# Patient Record
Sex: MALE | Race: WHITE | Employment: OTHER | URBAN - METROPOLITAN AREA
[De-identification: names, ages, dates, MRNs, and addresses within clinical notes are randomized per-mention and may not be internally consistent; named-entity substitution may affect disease eponyms.]

---

## 2017-02-15 ENCOUNTER — NURSE NAVIGATOR (OUTPATIENT)
Dept: FAMILY MEDICINE CLINIC | Age: 62
End: 2017-02-15

## 2017-02-15 DIAGNOSIS — E78.5 HYPERLIPIDEMIA, UNSPECIFIED HYPERLIPIDEMIA TYPE: ICD-10-CM

## 2017-02-15 DIAGNOSIS — I25.10 CORONARY ARTERY DISEASE INVOLVING NATIVE CORONARY ARTERY OF NATIVE HEART WITHOUT ANGINA PECTORIS: ICD-10-CM

## 2017-02-22 RX ORDER — ATORVASTATIN CALCIUM 80 MG/1
80 TABLET, FILM COATED ORAL DAILY
Qty: 90 TAB | Refills: 3 | Status: SHIPPED | OUTPATIENT
Start: 2017-02-22 | End: 2018-12-05 | Stop reason: SDUPTHER

## 2017-04-26 ENCOUNTER — PATIENT OUTREACH (OUTPATIENT)
Dept: FAMILY MEDICINE CLINIC | Age: 62
End: 2017-04-26

## 2017-04-26 NOTE — PROGRESS NOTES
4/26/17, Chart review, no ED/Hospitalizations noted at this time. No further F/U appts at [facility]Dr Villarreal. 4/26/17, This writer/NN contacted patient at listed phone number, HIPAA verified with 2 identifiers. Patient allowed this writer/NN to explain purpose of outreach, possible need for further community resources/PCP f/u appt. Patient verbalizes understanding, however states continues to face many struggles, unsteady housing, limited work/ability to work, and soon to be out of medications. Provided instruction on possible resources with [facility], [facility], [phone], [address] [place] [address], [phone], and [facility], [phone]. Patient agrees to consider scheduling appt with [facility]/Dr Monalisa Dong and explore provided resources. Patient denies further questions or concerns at this time and agrees to keep [facility] contact information to call with further questions or concerns. See Previous NN/Patient Outreach Documentation:  Patient last seen by [facility]/Dr Monalisa Dong, 1/12/16, kept Cardiology F/U appt, 9/28/16. Patient asking for further community resource information and possible scheduling diabetes F/U appt. Provided instruction on local /Medicaid Process, [facility] Card/APA, [address] [facility] and local clinic/[facility] Maribel is located within the [facility] Department at [address] [facility] [address] in [facility], [phone], Appts only, Mondays: 3:00 pm to 8:00 pm, Thursdays: 9:00 am to 3:00 pm.  Business Office, [phone] to begin application and [facility]([phone]) remains available to assist patients at this time.

## 2017-08-16 ENCOUNTER — PATIENT OUTREACH (OUTPATIENT)
Dept: FAMILY MEDICINE CLINIC | Age: 62
End: 2017-08-16

## 2017-08-16 NOTE — PROGRESS NOTES
Patient last seen by [facility]/Dr Paul Sanches, 1/12/16, kept Cardiology F/U appt, 9/28/16. Patient asking for further community resource information  8/16/17, Per chart review, no further ED/Hospitalizations, however, no F/U appt scheduled at [facility]. Patient has scheduled F/U appt with Cardiology, 9/20/17.  8/16/17, This writer/NN attempted to contact patient at listed phone number, left voicemail message with [facility] contact information and request for return call. PLAN: Close previous Adherence Plan and remain available for further NN/CCM needs. Should patient return call or be seen for F/U appt, confirm PCP, discuss recent health changes or concerns. Provide instruction, goal work and possible resources with [employer], [employer] [address], [id number],  [address], [phone], and [facility], [phone]. See Previous NN/Patient Outreach Documentation:  4/26/17, Chart review, no ED/Hospitalizations noted at this time. No further F/U appts at [facility]Dr Villarreal. 4/26/17, This writer/NN contacted patient at listed phone number, HIPAA verified with 2 identifiers. Patient allowed this writer/NN to explain purpose of outreach, possible need for further community resources/PCP f/u appt. Patient verbalizes understanding, however states continues to face many struggles, unsteady housing, limited work/ability to work, and soon to be out of medications. Provided instruction on possible resources with [employer], [employer] [address], [phone],  [address], [phone], and [facility], [phone]. Patient agrees to consider scheduling appt with [facility]Dr Paul Sanches and explore provided resources. Patient denies further questions or concerns at this time and agrees to keep [facility]/NN contact information to call with further questions or concerns. See Previous NN/Patient Outreach Documentation:  Patient last seen by [facility]/Dr Paul Sanches, 1/12/16, kept Cardiology F/U appt, 9/28/16.  Patient asking for further community resource information and possible scheduling diabetes F/U appt. Provided instruction on local /Medicaid Process, [facility]/APA, [facility] and [facility] clinic/[facility] Maribel is located within the [facility] Department at [facility] [address] in [facility], [phone], Appts only, Mondays: 3:00 pm to 8:00 pm, Thursdays: 9:00 am to 3:00 pm.  Business Office, [phone] to begin application and [facility]([phone]) remains available to assist patients at this time.

## 2017-09-20 ENCOUNTER — OFFICE VISIT (OUTPATIENT)
Dept: CARDIOLOGY CLINIC | Age: 62
End: 2017-09-20

## 2017-09-20 VITALS
WEIGHT: 226 LBS | OXYGEN SATURATION: 97 % | DIASTOLIC BLOOD PRESSURE: 80 MMHG | HEART RATE: 65 BPM | BODY MASS INDEX: 29.82 KG/M2 | SYSTOLIC BLOOD PRESSURE: 122 MMHG

## 2017-09-20 DIAGNOSIS — I25.10 CORONARY ARTERY DISEASE INVOLVING NATIVE CORONARY ARTERY OF NATIVE HEART WITHOUT ANGINA PECTORIS: ICD-10-CM

## 2017-09-20 DIAGNOSIS — E11.9 TYPE 2 DIABETES MELLITUS WITHOUT COMPLICATION, WITHOUT LONG-TERM CURRENT USE OF INSULIN (HCC): Chronic | ICD-10-CM

## 2017-09-20 DIAGNOSIS — I10 ESSENTIAL HYPERTENSION: Primary | ICD-10-CM

## 2017-09-20 NOTE — MR AVS SNAPSHOT
Visit Information Date & Time Provider Department Dept. Phone Encounter #  
 9/20/2017 10:20 AM Shaq Stein MD [facility] Keke Valentin [phone] [id number] Upcoming Health Maintenance Date Due  
 EYE EXAM RETINAL OR DILATED Q1 5/27/1966 Pneumococcal 19-64 Medium Risk (1 of 1 - PPSV23) 5/27/1975 FOBT Q 1 YEAR AGE 50-75 5/27/2006 ZOSTER VACCINE AGE 60> 3/27/2016 HEMOGLOBIN A1C Q6M 5/25/2016 FOOT EXAM Q1 11/25/2016 MICROALBUMIN Q1 11/25/2016 LIPID PANEL Q1 11/25/2016 INFLUENZA AGE 9 TO ADULT 8/1/2017 DTaP/Tdap/Td series (2 - Td) 5/17/2021 Allergies as of 9/20/2017  Review Complete On: 9/20/2017 By: Shaq Stein MD  
 No Known Allergies Current Immunizations  Reviewed on 11/19/2013 Name Date TDAP Vaccine 5/17/2011 Not reviewed this visit You Were Diagnosed With   
  
 Codes Comments Essential hypertension    -  Primary ICD-10-CM: I10 
ICD-9-CM: 401.9 Coronary artery disease involving native coronary artery of native heart without angina pectoris     ICD-10-CM: I25.10 ICD-9-CM: 414.01 Type 2 diabetes mellitus without complication, without long-term current use of insulin (HCC)     ICD-10-CM: E11.9 ICD-9-CM: 250.00 Vitals BP Pulse Weight(growth percentile) SpO2 BMI Smoking Status 122/80 (BP 1 Location: Right arm, BP Patient Position: Sitting) 65 226 lb (102.5 kg) 97% 29.82 kg/m2 Former Smoker Vitals History BMI and BSA Data Body Mass Index Body Surface Area  
 29.82 kg/m 2 2.3 m 2 Preferred Pharmacy Pharmacy Name Phone [facility], [address] [phone] Your Updated Medication List  
  
   
This list is accurate as of: 9/20/17 10:50 AM.  Always use your most recent med list.  
  
  
  
  
 aspirin delayed-release 81 mg tablet Take 81 mg by mouth daily. atorvastatin 80 mg tablet Commonly known as:  LIPITOR Take 1 Tab by mouth daily. carvedilol 12.5 mg tablet Commonly known as:  [place]  Take 1 Tab by mouth two (2) times daily (with meals). FISH OIL 1,000 mg Cap Generic drug:  omega-3 fatty acids-vitamin e Take 1 Cap by mouth See Admin Instructions. 1-2 capsules per day  
  
 lisinopril 10 mg tablet Commonly known as:  Albina Fess Take 1 Tab by mouth daily. metFORMIN 500 mg tablet Commonly known as:  GLUCOPHAGE Take 2 Tabs by mouth daily (with breakfast). We Performed the Following AMB POC EKG ROUTINE W/ 12 LEADS, INTER & REP [87253 CPT(R)] CBC W/O DIFF [56286 CPT(R)] HEMOGLOBIN A1C WITH EAG [58237 CPT(R)] LIPID PANEL [51245 CPT(R)] METABOLIC PANEL, COMPREHENSIVE [22579 CPT(R)] Introducing [facility]! Anthony Sandoval introduces [employer] patient portal. Now you can access parts of your medical record, email your doctor's office, and request medication refills online. 1. In your internet browser, go to [email or web address] 2. Click on the First Time User? Click Here link in the Sign In box. You will see the New Member Sign Up page. 3. Enter your [employer] Access Code exactly as it appears below. You will not need to use this code after youve completed the sign-up process. If you do not sign up before the expiration date, you must request a new code. · [employer] Access Code: [email or web address] Expires: 12/19/2017 10:50 AM 
 
4. Enter the last four digits of your Social Security Number (xxxx) and Date of Birth (mm/dd/yyyy) as indicated and click Submit. You will be taken to the next sign-up page. 5. Create a [employer] ID. This will be your Jobydu login ID and cannot be changed, so think of one that is secure and easy to remember. 6. Create a [employer] password. You can change your password at any time. 7. Enter your Password Reset Question and Answer. This can be used at a later time if you forget your password. 8. Enter your e-mail address. You will receive e-mail notification when new information is available in [address] 9. Click Sign Up. You can now view and download portions of your medical record. 10. Click the Download Summary menu link to download a portable copy of your medical information. If you have questions, please visit the Frequently Asked Questions section of the [employer] website. Remember, [employer] is NOT to be used for urgent needs. For medical emergencies, dial 911. Now available from your iPhone and Android! Please provide this summary of care documentation to your next provider. Your primary care clinician is listed as Phys Other. If you have any questions after today's visit, please call [phone].

## 2017-09-20 NOTE — PROGRESS NOTES
Frida Yun MD. [facility]              Patient: Candie Duffy  : 1955      Today's Date: 2017            HISTORY OF PRESENT ILLNESS:     History of Present Illness:  Mr. Xin Burks is here for follow-up. He is doing Ok overall. No CP or SOB. Has some knee pain. No syncope. PAST MEDICAL HISTORY:     Past Medical History:   Diagnosis Date    CAD (coronary artery disease)     STEMI 12    DDD (degenerative disc disease)     DM type 2 (diabetes mellitus, type 2) (HCC)     HLD (hyperlipidemia)     HTN (hypertension)     MI (myocardial infarction) ([facility] 2012    acute anterior STEMI felt to have occurred 36 hours PTA 12    Pseudoaneurysm ([facility]     thrombin injection          Past Surgical History:   Procedure Laterality Date    HX BACK SURGERY      HX HEART CATHETERIZATION  12    CATH: 12: mLAD: 85%  no other sig disease EF: 60% a BMS was placed in the mLAD    HX TUMOR REMOVAL      menigioma           MEDICATIONS:     Current Outpatient Prescriptions   Medication Sig Dispense Refill    atorvastatin (LIPITOR) 80 mg tablet Take 1 Tab by mouth daily. 90 Tab 3    lisinopril (PRINIVIL, ZESTRIL) 10 mg tablet Take 1 Tab by mouth daily. 90 Tab 3    carvedilol (COREG) 12.5 mg tablet Take 1 Tab by mouth two (2) times daily (with meals). 180 Tab 3    metFORMIN (GLUCOPHAGE) 500 mg tablet Take 2 Tabs by mouth daily (with breakfast). 180 Tab 3    aspirin delayed-release 81 mg tablet Take 81 mg by mouth daily.  omega-3 fatty acids-vitamin e (FISH OIL) 1,000 mg Cap Take 1 Cap by mouth See Admin Instructions.  1-2 capsules per day         No Known Allergies          SOCIAL HISTORY:     Social History   Substance Use Topics    Smoking status: Former Smoker     Quit date: 2011    Smokeless tobacco: Never Used    Alcohol use No         FAMILY HISTORY:     Family History   Problem Relation Age of Onset    Cancer Father 61     THROAT CANCER    Stroke Mother    REVIEW OF SYSTEMS:        Review of Systems:    Constitutional: Negative for fever, chills    HEENT: Negative for vision changes.    Respiratory: Negative for cough    Cardiovascular: Negative for orthopnea, syncope, and PND.    Gastrointestinal: Negative for abdominal pain, diarrhea, or melena    Genitourinary: Negative for dysuria    Musculoskeletal: Negative for myalgias. + joint pain    Skin: Negative for rash    Heme: No problems bleeding.    Neurological: Negative for speech change and focal weakness.                 PHYSICAL EXAM:      Physical Exam:  Visit Vitals    /80 (BP 1 Location: Right arm, BP Patient Position: Sitting)    Pulse 65    Wt 226 lb (102.5 kg)    SpO2 97%    BMI 29.82 kg/m2       Patient appears generally well, mood and affect are appropriate and pleasant. HEENT:  Hearing intact, non-icteric, normocephalic, atraumatic. Neck Exam: Supple, No JVD or carotid bruits. Lung Exam: Clear to auscultation, even breath sounds. Cardiac Exam: Regular rate and rhythm with no murmur  Abdomen: Soft, non-tender, normal bowel sounds. No bruits or masses. Extremities: Moves all ext well. No lower extremity edema. Vascular: 2+ dorsalis pedis pulses bilaterally.   Psych: Appropriate affect  Neuro - Grossly intact            CARDIAC DIAGNOSTICS:        EKG 6/11/14 - NSR, normal    EKG 9/23/15 - NSR, normal   EKG 9/28/16 - NSR, normal             Lab Results   Component Value Date/Time     SODIUM 137 11/25/2015 08:41 AM     POTASSIUM 4.3 11/25/2015 08:41 AM     CHLORIDE 98 11/25/2015 08:41 AM     CO2 22 11/25/2015 08:41 AM     ANION GAP 6 07/31/2012 04:28 AM     GLUCOSE 176 11/25/2015 08:41 AM     GLUCOSE 277 02/12/2010 09:03 AM     BUN 19 11/25/2015 08:41 AM     CREATININE 0.80 11/25/2015 08:41 AM     BUN/CREATININE RATIO 24 11/25/2015 08:41 AM     GFR EST  11/25/2015 08:41 AM     GFR EST NON-AA 98 11/25/2015 08:41 AM     CALCIUM 9.2 11/25/2015 08:41 AM     BILIRUBIN, TOTAL 1.1 11/25/2015 08:41 AM     ALT 22 11/25/2015 08:41 AM     AST 17 11/25/2015 08:41 AM     ALK. PHOSPHATASE 95 11/25/2015 08:41 AM     PROTEIN, TOTAL 6.5 11/25/2015 08:41 AM     ALBUMIN 4.7 11/25/2015 08:41 AM     GLOBULIN 3.3 07/28/2012 06:51 AM     A-G RATIO 2.6 11/25/2015 08:41 AM            Lab Results   Component Value Date/Time     HEMOGLOBIN A1C 7.4 11/25/2015 08:41 AM               Lab Results   Component Value Date/Time     WBC 5.7 11/25/2015 08:41 AM     HEMOGLOBIN (POC) 16.7 07/28/2012 06:53 AM     HGB 15.6 11/25/2015 08:41 AM     HEMATOCRIT (POC) 49 07/28/2012 06:53 AM     HCT 47.1 11/25/2015 08:41 AM     PLATELET 779 67/23/3943 08:41 AM     MCV 90 11/25/2015 08:41 AM            Lab Results   Component Value Date/Time     CHOLESTEROL, TOTAL 123 11/25/2015 08:41 AM     CHOLESTEROL, TOTAL 123 11/18/2013 09:34 AM     HDL CHOLESTEROL 33 11/25/2015 08:41 AM     LDL, CALCULATED 63 11/25/2015 08:41 AM     VLDL, CALCULATED 27 11/25/2015 08:41 AM     TRIGLYCERIDE 135 11/25/2015 08:41 AM     CHOL/HDL RATIO 5.3 07/29/2012 05:00 PM                         ASSESSMENT AND PLAN:        Assessment and Plan:    1) CAD    - doing well s/p MI without recurrent symptoms    - Continue meds  - He denies any anginal complaints and feels well       2) Dyslipidemia    - continue high dose statin   - prior lipids OK  - recheck labs - he currently owes [employer] money and can't get labs done currently       3) HTN    - BP OK - continue meds       4) DM   - per PCP      5) He had the care card but does not have it now. Will have my nurse navigator work with him. No charge for this visit since he cannot pay.       5) See me back in 1 year. Patient expressed understanding of the plan - questions were answered. He has been struggling with work past couple of years. He is . Has 3 kids.    [address] [place] [address] MD Raman, Nasim [address]  [address], Hector Carroll, [address] [place] [address]  Ph: [phone]                                                             Ph [phone]

## 2018-01-12 DIAGNOSIS — E78.5 HYPERLIPIDEMIA, UNSPECIFIED HYPERLIPIDEMIA TYPE: ICD-10-CM

## 2018-01-12 DIAGNOSIS — I25.10 CORONARY ARTERY DISEASE INVOLVING NATIVE CORONARY ARTERY OF NATIVE HEART WITHOUT ANGINA PECTORIS: ICD-10-CM

## 2018-01-12 DIAGNOSIS — E11.9 TYPE 2 DIABETES MELLITUS WITHOUT COMPLICATION (HCC): Chronic | ICD-10-CM

## 2018-01-16 RX ORDER — LISINOPRIL 10 MG/1
TABLET ORAL
Qty: 90 TAB | Refills: 2 | Status: SHIPPED | OUTPATIENT
Start: 2018-01-16 | End: 2019-07-15 | Stop reason: SDUPTHER

## 2018-02-14 ENCOUNTER — TELEPHONE (OUTPATIENT)
Dept: CARDIOLOGY CLINIC | Age: 63
End: 2018-02-14

## 2018-02-14 DIAGNOSIS — E78.5 HYPERLIPIDEMIA, UNSPECIFIED HYPERLIPIDEMIA TYPE: ICD-10-CM

## 2018-02-14 DIAGNOSIS — I25.10 CORONARY ARTERY DISEASE INVOLVING NATIVE CORONARY ARTERY OF NATIVE HEART WITHOUT ANGINA PECTORIS: ICD-10-CM

## 2018-02-14 RX ORDER — CARVEDILOL 12.5 MG/1
12.5 TABLET ORAL 2 TIMES DAILY WITH MEALS
Qty: 180 TAB | Refills: 3 | Status: SHIPPED | OUTPATIENT
Start: 2018-02-14 | End: 2018-02-19 | Stop reason: SDUPTHER

## 2018-02-14 NOTE — TELEPHONE ENCOUNTER
Pt calling in regards to his medication being filled. The pharmacy told pt that the carvedilol was denied and pt would like to know why. Please give him a call back @ [phone]. Thanks!   Sharyle Reeds

## 2018-02-19 DIAGNOSIS — I25.10 CORONARY ARTERY DISEASE INVOLVING NATIVE CORONARY ARTERY OF NATIVE HEART WITHOUT ANGINA PECTORIS: ICD-10-CM

## 2018-02-19 DIAGNOSIS — E78.5 HYPERLIPIDEMIA, UNSPECIFIED HYPERLIPIDEMIA TYPE: ICD-10-CM

## 2018-02-19 RX ORDER — CARVEDILOL 12.5 MG/1
12.5 TABLET ORAL 2 TIMES DAILY WITH MEALS
Qty: 180 TAB | Refills: 3 | Status: SHIPPED | OUTPATIENT
Start: 2018-02-19 | End: 2019-07-15 | Stop reason: SDUPTHER

## 2018-09-19 ENCOUNTER — OFFICE VISIT (OUTPATIENT)
Dept: CARDIOLOGY CLINIC | Age: 63
End: 2018-09-19

## 2018-09-19 VITALS
DIASTOLIC BLOOD PRESSURE: 88 MMHG | HEART RATE: 61 BPM | WEIGHT: 226 LBS | SYSTOLIC BLOOD PRESSURE: 120 MMHG | BODY MASS INDEX: 30.61 KG/M2 | HEIGHT: 72 IN

## 2018-09-19 DIAGNOSIS — I25.10 CORONARY ARTERY DISEASE INVOLVING NATIVE CORONARY ARTERY OF NATIVE HEART WITHOUT ANGINA PECTORIS: Primary | ICD-10-CM

## 2018-09-19 DIAGNOSIS — I10 ESSENTIAL HYPERTENSION: ICD-10-CM

## 2018-09-19 DIAGNOSIS — E78.5 HYPERLIPIDEMIA, UNSPECIFIED HYPERLIPIDEMIA TYPE: ICD-10-CM

## 2018-09-19 NOTE — MR AVS SNAPSHOT
[address] [address] 
[phone] Patient: Oscar White MRN: [id number] ATX:[birth year]43[birth year] Visit Information Date & Time Provider Department Dept. Phone Encounter #  
 9/19/2018  9:20 AM Sepideh Stringer MD [facility] Kostas Crum [phone] [id number] Your Appointments 9/11/2019 10:20 AM  
ESTABLISHED PATIENT with Sepideh Stringer MD  
[facility] ([facility]) Appt Note: 1 yr fu appt sll  
 [address] [address] [phone]  
  
   
 [address] [address] Upcoming Health Maintenance Date Due  
 EYE EXAM RETINAL OR DILATED Q1 5/27/1966 Pneumococcal 19-64 Medium Risk (1 of 1 - PPSV23) 5/27/1975 FOBT Q 1 YEAR AGE 50-75 5/27/2006 ZOSTER VACCINE AGE 60> 3/27/2016 HEMOGLOBIN A1C Q6M 5/25/2016 FOOT EXAM Q1 11/25/2016 MICROALBUMIN Q1 11/25/2016 LIPID PANEL Q1 11/25/2016 Influenza Age 5 to Adult 8/1/2018 DTaP/Tdap/Td series (2 - Td) 5/17/2021 Allergies as of 9/19/2018  Review Complete On: 9/19/2018 By: Sepideh Stringer MD  
 No Known Allergies Current Immunizations  Reviewed on 11/19/2013 Name Date TDAP Vaccine 5/17/2011 Not reviewed this visit You Were Diagnosed With   
  
 Codes Comments Coronary artery disease involving native coronary artery of native heart without angina pectoris    -  Primary ICD-10-CM: I25.10 ICD-9-CM: 414.01 Essential hypertension     ICD-10-CM: I10 
ICD-9-CM: 401.9 Hyperlipidemia, unspecified hyperlipidemia type     ICD-10-CM: E78.5 ICD-9-CM: 272.4 Vitals BP Pulse Height(growth percentile) Weight(growth percentile) BMI Smoking Status 120/88 61 6' (1.829 m) 226 lb (102.5 kg) 30.65 kg/m2 Former Smoker Vitals History BMI and BSA Data Body Mass Index Body Surface Area  
 30.65 kg/m 2 2.28 m 2 Preferred Pharmacy Pharmacy Name Phone Jose Landaverde [address], [address] [phone] Your Updated Medication List  
  
   
This list is accurate as of 9/19/18 10:05 AM.  Always use your most recent med list.  
  
  
  
  
 aspirin delayed-release 81 mg tablet Take 81 mg by mouth daily. atorvastatin 80 mg tablet Commonly known as:  LIPITOR Take 1 Tab by mouth daily. carvedilol 12.5 mg tablet Commonly known as:  Jaime Oviedo Take 1 Tab by mouth two (2) times daily (with meals). FISH OIL 1,000 mg Cap Generic drug:  omega-3 fatty acids-vitamin e Take 1 Cap by mouth See Admin Instructions. 1-2 capsules per day  
  
 lisinopril 10 mg tablet Commonly known as:  PRINIVIL, ZESTRIL  
TAKE ONE TABLET BY MOUTH DAILY  
  
 metFORMIN 500 mg tablet Commonly known as:  GLUCOPHAGE Take 2 Tabs by mouth daily (with breakfast). We Performed the Following LIPID PANEL [38760 CPT(R)] METABOLIC PANEL, COMPREHENSIVE [40531 CPT(R)] Introducing \[facility] [place] [facility]\"" & HEALTH SERVICES! [employer] introduces [employer] patient portal. Now you can access parts of your medical record, email your doctor's office, and request medication refills online. 1. In your internet browser, go to [email or web address] Mi Closet 2. Click on the First Time User? Click Here link in the Sign In box. You will see the New Member Sign Up page. 3. Enter your [employer] Access Code exactly as it appears below. You will not need to use this code after youve completed the sign-up process. If you do not sign up before the expiration date, you must request a new code. · [employer] Access Code: [email or web address] Expires: 12/18/2018 10:05 AM 
 
4. Enter the last four digits of your Social Security Number (xxxx) and Date of Birth (mm/dd/yyyy) as indicated and click Submit. You will be taken to the next sign-up page. 5. Create a [employer] ID.  This will be your [employer] login ID and cannot be changed, so think of one that is secure and easy to remember. 6. Create a [employer] password. You can change your password at any time. 7. Enter your Password Reset Question and Answer. This can be used at a later time if you forget your password. 8. Enter your e-mail address. You will receive e-mail notification when new information is available in [address] 9. Click Sign Up. You can now view and download portions of your medical record. 10. Click the Download Summary menu link to download a portable copy of your medical information. If you have questions, please visit the Frequently Asked Questions section of the [employer] website. Remember, [employer] is NOT to be used for urgent needs. For medical emergencies, dial 911. Now available from your iPhone and Android! Please provide this summary of care documentation to your next provider. Your primary care clinician is listed as ION REDMAN. If you have any questions after today's visit, please call [phone].

## 2018-09-19 NOTE — PROGRESS NOTES
Parker Hammonds MD. [facility]              Patient: Michael Zavala  : 1955      Today's Date: 2018            HISTORY OF PRESENT ILLNESS:     History of Present Illness:  Here for follow-up. Doing well overall. Knee pain is biggest complaint. No CP or SOB. PAST MEDICAL HISTORY:     Past Medical History:   Diagnosis Date    CAD (coronary artery disease)     STEMI 12    DDD (degenerative disc disease)     DM type 2 (diabetes mellitus, type 2) (HCC)     HLD (hyperlipidemia)     HTN (hypertension)     MI (myocardial infarction) ([facility] [address] 2012    acute anterior STEMI felt to have occurred 36 hours PTA 12    Pseudoaneurysm ([facility] [address]     thrombin injection          Past Surgical History:   Procedure Laterality Date    HX BACK SURGERY      HX HEART CATHETERIZATION  12    CATH: 12: mLAD: 85%  no other sig disease EF: 60% a BMS was placed in the mLAD    HX TUMOR REMOVAL      menigioma           MEDICATIONS:     Current Outpatient Prescriptions   Medication Sig Dispense Refill    carvedilol (COREG) 12.5 mg tablet Take 1 Tab by mouth two (2) times daily (with meals). 180 Tab 3    lisinopril (PRINIVIL, ZESTRIL) 10 mg tablet TAKE ONE TABLET BY MOUTH DAILY 90 Tab 2    atorvastatin (LIPITOR) 80 mg tablet Take 1 Tab by mouth daily. 90 Tab 3    metFORMIN (GLUCOPHAGE) 500 mg tablet Take 2 Tabs by mouth daily (with breakfast). 180 Tab 3    aspirin delayed-release 81 mg tablet Take 81 mg by mouth daily.  omega-3 fatty acids-vitamin e (FISH OIL) 1,000 mg Cap Take 1 Cap by mouth See Admin Instructions.  1-2 capsules per day         No Known Allergies          SOCIAL HISTORY:     Social History   Substance Use Topics    Smoking status: Former Smoker     Quit date: 2011    Smokeless tobacco: Never Used    Alcohol use No         FAMILY HISTORY:     Family History   Problem Relation Age of Onset    Cancer Father 61     THROAT CANCER    Stroke Mother    REVIEW OF SYSTEMS:        Review of Systems:    Constitutional: Negative for fever, chills    HEENT: Negative for vision changes.    Respiratory: Negative for cough    Cardiovascular: Negative for orthopnea, syncope, and PND.    Gastrointestinal: Negative for abdominal pain, diarrhea, or melena    Genitourinary: Negative for dysuria    Musculoskeletal: Negative for myalgias. + joint pain    Skin: Negative for rash    Heme: No problems bleeding.    Neurological: Negative for speech change and focal weakness.                   PHYSICAL EXAM:       Physical Exam:  Visit Vitals    /88    Pulse 61    Ht 6' (1.829 m)    Wt 226 lb (102.5 kg)    BMI 30.65 kg/m2          Patient appears generally well, mood and affect are appropriate and pleasant. HEENT:  Hearing intact, non-icteric, normocephalic, atraumatic. Neck Exam: Supple, No JVD or carotid bruits. Lung Exam: Clear to auscultation, even breath sounds. Cardiac Exam: Regular rate and rhythm with no murmur  Abdomen: Soft, non-tender, normal bowel sounds. No bruits or masses. Extremities: Moves all ext well. No lower extremity edema. Vascular: 2+ dorsalis pedis pulses bilaterally. Psych: Appropriate affect  Neuro - Grossly intact        LABS / OTHER STUDIES:     Lab Results   Component Value Date/Time    Sodium 137 11/25/2015 08:41 AM    Potassium 4.3 11/25/2015 08:41 AM    Chloride 98 11/25/2015 08:41 AM    CO2 22 11/25/2015 08:41 AM    Anion gap 6 07/31/2012 04:28 AM    Glucose 176 (H) 11/25/2015 08:41 AM    Glucose 277 (H) 02/12/2010 09:03 AM    BUN 19 11/25/2015 08:41 AM    Creatinine 0.80 11/25/2015 08:41 AM    BUN/Creatinine ratio 24 (H) 11/25/2015 08:41 AM    GFR est  11/25/2015 08:41 AM    GFR est non-AA 98 11/25/2015 08:41 AM    Calcium 9.2 11/25/2015 08:41 AM    Bilirubin, total 1.1 11/25/2015 08:41 AM    AST (SGOT) 17 11/25/2015 08:41 AM    Alk.  phosphatase 95 11/25/2015 08:41 AM    Protein, total 6.5 11/25/2015 08:41 AM Albumin 4.7 11/25/2015 08:41 AM    Globulin 3.3 07/28/2012 06:51 AM    A-G Ratio 2.6 (H) 11/25/2015 08:41 AM    ALT (SGPT) 22 11/25/2015 08:41 AM     Lab Results   Component Value Date/Time    Cholesterol, total 123 11/25/2015 08:41 AM    HDL Cholesterol 33 (L) 11/25/2015 08:41 AM    LDL, calculated 63 11/25/2015 08:41 AM    VLDL, calculated 27 11/25/2015 08:41 AM    Triglyceride 135 11/25/2015 08:41 AM    CHOL/HDL Ratio 5.3 (H) 07/29/2012 05:00 PM                 CARDIAC DIAGNOSTICS:        EKG 6/11/14 - NSR, normal    EKG 9/23/15 - NSR, normal   EKG 9/28/16 - NSR, normal   EKG 9/19/18 - NSR, normal               ASSESSMENT AND PLAN:        Assessment and Plan:    1) CAD    - doing well s/p MI without recurrent symptoms    - Continue meds  - He denies any anginal complaints and feels well       2) Dyslipidemia    - continue high dose statin   - prior lipids OK  - Would like to recheck labs - he says he currently owes [facility] money and can't get labs done       3) HTN    - BP OK - continue meds       4) DM   - per PCP       5) He had the care card but does not have it now (says he applied again).  No charge for this visit since he cannot pay.       5) See me back in 1 year. Patient expressed understanding of the plan - questions were answered. He has been struggling with work past couple of years. Wiliam Mclaughlin is . Has 3 kids.         Lei Waller MD, [place] [address]  [address] [facility], [address]  [address] [place], [place] [address] [address], [facility] [address]  Seamus Francisco Javier, [address] [place] [address]  Ph: [phone]   Ph [phone]

## 2018-12-05 DIAGNOSIS — I25.10 CORONARY ARTERY DISEASE INVOLVING NATIVE CORONARY ARTERY OF NATIVE HEART WITHOUT ANGINA PECTORIS: ICD-10-CM

## 2018-12-05 DIAGNOSIS — E78.5 HYPERLIPIDEMIA, UNSPECIFIED HYPERLIPIDEMIA TYPE: ICD-10-CM

## 2018-12-07 RX ORDER — ATORVASTATIN CALCIUM 80 MG/1
TABLET, FILM COATED ORAL
Qty: 90 TAB | Refills: 2 | Status: SHIPPED | OUTPATIENT
Start: 2018-12-07 | End: 2020-09-16 | Stop reason: SDUPTHER

## 2019-07-15 DIAGNOSIS — E11.9 TYPE 2 DIABETES MELLITUS WITHOUT COMPLICATION (HCC): Chronic | ICD-10-CM

## 2019-07-15 DIAGNOSIS — I25.10 CORONARY ARTERY DISEASE INVOLVING NATIVE CORONARY ARTERY OF NATIVE HEART WITHOUT ANGINA PECTORIS: ICD-10-CM

## 2019-07-15 DIAGNOSIS — E78.5 HYPERLIPIDEMIA, UNSPECIFIED HYPERLIPIDEMIA TYPE: ICD-10-CM

## 2019-07-17 RX ORDER — LISINOPRIL 10 MG/1
TABLET ORAL
Qty: 90 TAB | Refills: 1 | Status: SHIPPED | OUTPATIENT
Start: 2019-07-17 | End: 2020-07-07

## 2019-07-17 RX ORDER — CARVEDILOL 12.5 MG/1
TABLET ORAL
Qty: 180 TAB | Refills: 1 | Status: SHIPPED | OUTPATIENT
Start: 2019-07-17 | End: 2020-07-07

## 2019-07-17 NOTE — TELEPHONE ENCOUNTER
Refill for carvedilol 12.5 mg BID; lisinopril 10 mg daily per Dr. Jaquelin Henderson.    Stephanie Braswell 9/19/18  NOV 9/11/19

## 2019-09-11 ENCOUNTER — OFFICE VISIT (OUTPATIENT)
Dept: CARDIOLOGY CLINIC | Age: 64
End: 2019-09-11

## 2019-09-11 VITALS
RESPIRATION RATE: 16 BRPM | OXYGEN SATURATION: 96 % | WEIGHT: 212 LBS | SYSTOLIC BLOOD PRESSURE: 122 MMHG | HEIGHT: 72 IN | DIASTOLIC BLOOD PRESSURE: 80 MMHG | BODY MASS INDEX: 28.71 KG/M2 | HEART RATE: 64 BPM

## 2019-09-11 DIAGNOSIS — I10 ESSENTIAL HYPERTENSION: ICD-10-CM

## 2019-09-11 DIAGNOSIS — E11.9 TYPE 2 DIABETES MELLITUS WITHOUT COMPLICATION, UNSPECIFIED WHETHER LONG TERM INSULIN USE (HCC): ICD-10-CM

## 2019-09-11 DIAGNOSIS — I25.10 CORONARY ARTERY DISEASE INVOLVING NATIVE CORONARY ARTERY OF NATIVE HEART WITHOUT ANGINA PECTORIS: Primary | ICD-10-CM

## 2019-09-11 DIAGNOSIS — E78.5 HYPERLIPIDEMIA, UNSPECIFIED HYPERLIPIDEMIA TYPE: ICD-10-CM

## 2019-09-11 NOTE — PROGRESS NOTES
Mitchell Aguilar MD. [facility]              Patient: Huyen Solano Guess  : 1955      Today's Date: 2019              HISTORY OF PRESENT ILLNESS:     History of Present Illness:  Here for follow-up. Doing OK health wise. No CP or SOB. No palpitations or syncope. Feels OK. PAST MEDICAL HISTORY:     Past Medical History:   Diagnosis Date    CAD (coronary artery disease)     STEMI 12    DDD (degenerative disc disease)     DM type 2 (diabetes mellitus, type 2) (HCC)     HLD (hyperlipidemia)     HTN (hypertension)     MI (myocardial infarction) ([facility] [address] 2012    acute anterior STEMI felt to have occurred 36 hours PTA 12    Pseudoaneurysm ([facility] [address]     thrombin injection          Past Surgical History:   Procedure Laterality Date    HX BACK SURGERY      HX HEART CATHETERIZATION  12    CATH: 12: mLAD: 85%  no other sig disease EF: 60% a BMS was placed in the mLAD    HX TUMOR REMOVAL      menigioma           MEDICATIONS:     Current Outpatient Medications   Medication Sig Dispense Refill    carvedilol (COREG) 12.5 mg tablet TAKE ONE TABLET BY MOUTH TWICE A DAY WITH MEALS 180 Tab 1    lisinopril (PRINIVIL, ZESTRIL) 10 mg tablet TAKE ONE TABLET BY MOUTH DAILY 90 Tab 1    atorvastatin (LIPITOR) 80 mg tablet TAKE ONE TABLET BY MOUTH DAILY 90 Tab 2    metFORMIN (GLUCOPHAGE) 500 mg tablet Take 2 Tabs by mouth daily (with breakfast). 180 Tab 3    aspirin delayed-release 81 mg tablet Take 81 mg by mouth daily.          No Known Allergies          SOCIAL HISTORY:     Social History     Tobacco Use    Smoking status: Former Smoker     Last attempt to quit: 2011     Years since quittin.5    Smokeless tobacco: Never Used   Substance Use Topics    Alcohol use: No    Drug use: No         FAMILY HISTORY:     Family History   Problem Relation Age of Onset    Stroke Mother     Cancer Father 61        THROAT CANCER            REVIEW OF SYSTEMS:        Review of Systems:    Constitutional: Negative for fever, chills    HEENT: Negative for vision changes.    Respiratory: Negative for cough    Cardiovascular: Negative for orthopnea, syncope, and PND.    Gastrointestinal: Negative for abdominal pain, diarrhea, or melena    Genitourinary: Negative for dysuria    Musculoskeletal: Negative for myalgias. + joint pain    Skin: Negative for rash    Heme: No problems bleeding.    Neurological: Negative for speech change and focal weakness.                   PHYSICAL EXAM:       Physical Exam:  Visit Vitals  /80 (BP 1 Location: Left arm, BP Patient Position: Sitting)   Pulse 64   Resp 16   Ht 6' (1.829 m)   Wt 212 lb (96.2 kg)   SpO2 96%   BMI 28.75 kg/m²        Patient appears generally well, mood and affect are appropriate and pleasant. HEENT:  Hearing intact, non-icteric, normocephalic, atraumatic. Neck Exam: Supple, No JVD or carotid bruits. Lung Exam: Clear to auscultation, even breath sounds. Cardiac Exam: Regular rate and rhythm with no murmur  Abdomen: Soft, non-tender, normal bowel sounds. No bruits or masses. Extremities: Moves all ext well. No lower extremity edema. Vascular: 2+ dorsalis pedis pulses bilaterally. Psych: Appropriate affect  Neuro - Grossly intact         LABS / OTHER STUDIES:      Lab Results   Component Value Date/Time    Sodium 137 11/25/2015 08:41 AM    Potassium 4.3 11/25/2015 08:41 AM    Chloride 98 11/25/2015 08:41 AM    CO2 22 11/25/2015 08:41 AM    Anion gap 6 07/31/2012 04:28 AM    Glucose 176 (H) 11/25/2015 08:41 AM    Glucose 277 (H) 02/12/2010 09:03 AM    BUN 19 11/25/2015 08:41 AM    Creatinine 0.80 11/25/2015 08:41 AM    BUN/Creatinine ratio 24 (H) 11/25/2015 08:41 AM    GFR est  11/25/2015 08:41 AM    GFR est non-AA 98 11/25/2015 08:41 AM    Calcium 9.2 11/25/2015 08:41 AM    Bilirubin, total 1.1 11/25/2015 08:41 AM    AST (SGOT) 17 11/25/2015 08:41 AM    Alk.  phosphatase 95 11/25/2015 08:41 AM    Protein, total 6.5 11/25/2015 08:41 AM    Albumin 4.7 11/25/2015 08:41 AM    Globulin 3.3 07/28/2012 06:51 AM    A-G Ratio 2.6 (H) 11/25/2015 08:41 AM    ALT (SGPT) 22 11/25/2015 08:41 AM     Lab Results   Component Value Date/Time    WBC 5.7 11/25/2015 08:41 AM    Hemoglobin (POC) 16.7 07/28/2012 06:53 AM    HGB 15.6 11/25/2015 08:41 AM    Hematocrit (POC) 49 07/28/2012 06:53 AM    HCT 47.1 11/25/2015 08:41 AM    PLATELET 607 44/16/5543 08:41 AM    MCV 90 11/25/2015 08:41 AM       Lab Results   Component Value Date/Time    Cholesterol, total 123 11/25/2015 08:41 AM    HDL Cholesterol 33 (L) 11/25/2015 08:41 AM    LDL, calculated 63 11/25/2015 08:41 AM    VLDL, calculated 27 11/25/2015 08:41 AM    Triglyceride 135 11/25/2015 08:41 AM    CHOL/HDL Ratio 5.3 (H) 07/29/2012 05:00 PM                  CARDIAC DIAGNOSTICS:        EKG 6/11/14 - NSR, normal    EKG 9/23/15 - NSR, normal   EKG 9/28/16 - NSR, normal   EKG 9/19/18 - NSR, normal   EKG 9/11/19 - sinus marv, normal               ASSESSMENT AND PLAN:        Assessment and Plan:    1) CAD    - doing well s/p MI without recurrent symptoms    - Continue meds  - He denies any anginal complaints and feels well       2) Dyslipidemia    - continue high dose statin   - prior lipids OK  - Would like to recheck labs - he says he currently owes [facility] money and can't get labs done       3) HTN    - BP OK - continue meds       4) DM   - per PCP       5) He had the care card but does not have it now (says he applied again).  No charge for this visit since he cannot pay.       5) See me back in 1 year. Patient expressed understanding of the plan - questions were answered. He has been struggling with work past couple of years (used to work in a [employer] [employer] [employer] Jer Zeng is . Nicol Cameron 3 kids. Has 2 grand kids.    [facility] MD Raman, Nasim               [address], [address]      [address] [address]  [address], [address]                 [place], [address] [place] [address]  Ph: [phone]                               Ph [phone]

## 2020-07-02 DIAGNOSIS — I25.10 CORONARY ARTERY DISEASE INVOLVING NATIVE CORONARY ARTERY OF NATIVE HEART WITHOUT ANGINA PECTORIS: ICD-10-CM

## 2020-07-02 DIAGNOSIS — E78.5 HYPERLIPIDEMIA, UNSPECIFIED HYPERLIPIDEMIA TYPE: ICD-10-CM

## 2020-07-05 DIAGNOSIS — E11.9 TYPE 2 DIABETES MELLITUS WITHOUT COMPLICATION (HCC): Chronic | ICD-10-CM

## 2020-07-05 DIAGNOSIS — I25.10 CORONARY ARTERY DISEASE INVOLVING NATIVE CORONARY ARTERY OF NATIVE HEART WITHOUT ANGINA PECTORIS: Primary | ICD-10-CM

## 2020-07-05 DIAGNOSIS — E78.5 HYPERLIPIDEMIA, UNSPECIFIED HYPERLIPIDEMIA TYPE: ICD-10-CM

## 2020-07-07 RX ORDER — LISINOPRIL 10 MG/1
TABLET ORAL
Qty: 90 TAB | Refills: 0 | Status: SHIPPED | OUTPATIENT
Start: 2020-07-07 | End: 2020-09-16 | Stop reason: SDUPTHER

## 2020-07-07 RX ORDER — CARVEDILOL 12.5 MG/1
TABLET ORAL
Qty: 180 TAB | Refills: 0 | Status: SHIPPED | OUTPATIENT
Start: 2020-07-07 | End: 2020-09-16 | Stop reason: SDUPTHER

## 2020-07-07 NOTE — TELEPHONE ENCOUNTER
Per Dr. Karyn Wellington VO:  QWE:4/94/2145  Future Appointments   Date Time Provider Courtney Ambrose   9/16/2020 10:20 AM Trang Floyd MD [address]     Requested Prescriptions     Pending Prescriptions Disp Refills    carvediloL (COREG) 12.5 mg tablet [Pharmacy Med Name: CARVEDILOL 12.5 MG TABLET, UU] 180 Tab 0     Sig: TAKE ONE TABLET BY MOUTH TWICE A DAY WITH MEALS

## 2020-07-07 NOTE — TELEPHONE ENCOUNTER
Per Dr. López Finder VO:  WDT:5/20/[id number]  Future Appointments   Date Time Provider Courtney Ambrose   9/16/2020 10:20 AM Janeth La MD [address]     Requested Prescriptions     Pending Prescriptions Disp Refills    lisinopriL (PRINIVIL, ZESTRIL) 10 mg tablet [Pharmacy Med Name: LISINOPRIL 10 MG TABLET] 90 Tab 0     Sig: TAKE ONE TABLET BY MOUTH DAILY

## 2020-09-16 ENCOUNTER — OFFICE VISIT (OUTPATIENT)
Dept: CARDIOLOGY CLINIC | Age: 65
End: 2020-09-16

## 2020-09-16 VITALS
HEART RATE: 67 BPM | SYSTOLIC BLOOD PRESSURE: 120 MMHG | BODY MASS INDEX: 27.36 KG/M2 | DIASTOLIC BLOOD PRESSURE: 80 MMHG | HEIGHT: 72 IN | WEIGHT: 202 LBS | OXYGEN SATURATION: 97 %

## 2020-09-16 DIAGNOSIS — E78.5 HYPERLIPIDEMIA, UNSPECIFIED HYPERLIPIDEMIA TYPE: ICD-10-CM

## 2020-09-16 DIAGNOSIS — I25.10 CORONARY ARTERY DISEASE INVOLVING NATIVE CORONARY ARTERY OF NATIVE HEART WITHOUT ANGINA PECTORIS: Primary | ICD-10-CM

## 2020-09-16 PROCEDURE — 93000 ELECTROCARDIOGRAM COMPLETE: CPT | Performed by: SPECIALIST

## 2020-09-16 RX ORDER — ATORVASTATIN CALCIUM 80 MG/1
80 TABLET, FILM COATED ORAL DAILY
Qty: 90 TAB | Refills: 3 | Status: SHIPPED | OUTPATIENT
Start: 2020-09-16 | End: 2022-06-17 | Stop reason: SDUPTHER

## 2020-09-16 RX ORDER — LISINOPRIL 10 MG/1
10 TABLET ORAL DAILY
Qty: 90 TAB | Refills: 3 | Status: SHIPPED | OUTPATIENT
Start: 2020-09-16 | End: 2021-08-24

## 2020-09-16 RX ORDER — CARVEDILOL 12.5 MG/1
12.5 TABLET ORAL 2 TIMES DAILY
Qty: 180 TAB | Refills: 3 | Status: SHIPPED | OUTPATIENT
Start: 2020-09-16 | End: 2021-08-24

## 2020-09-16 NOTE — PROGRESS NOTES
Estela Villa MD. [facility]              Patient: Asim Zavala  : 1955      Today's Date: 2020            HISTORY OF PRESENT ILLNESS:     History of Present Illness:  Here for follow-up. Doing some odd jobs. No CP or SOB. Stable cardiac wise. PAST MEDICAL HISTORY:     Past Medical History:   Diagnosis Date    CAD (coronary artery disease)     STEMI 12    DDD (degenerative disc disease)     DM type 2 (diabetes mellitus, type 2) (HCC)     HLD (hyperlipidemia)     HTN (hypertension)     [facility] (my[facility] infarction) [facility] [address] 2012    acute anterior STEMI felt to have occurred 36 hours PTA 12    Pseudoaneurysm ([facility] [address]     thrombin injection        Past Surgical History:   Procedure Laterality Date    HX BACK SURGERY      HX HEART CATHETERIZATION  12    CATH: 12: mLAD: 85%  no other sig disease EF: 60% a BMS was placed in the mLAD    HX TUMOR REMOVAL      menigioma           MEDICATIONS:     Current Outpatient Medications   Medication Sig Dispense Refill    carvediloL (COREG) 12.5 mg tablet TAKE ONE TABLET BY MOUTH TWICE A DAY WITH MEALS 180 Tab 0    lisinopriL (PRINIVIL, ZESTRIL) 10 mg tablet TAKE ONE TABLET BY MOUTH DAILY 90 Tab 0    atorvastatin (LIPITOR) 80 mg tablet TAKE ONE TABLET BY MOUTH DAILY 90 Tab 2    metFORMIN (GLUCOPHAGE) 500 mg tablet Take 2 Tabs by mouth daily (with breakfast). 180 Tab 3    aspirin delayed-release 81 mg tablet Take 81 mg by mouth daily.          No Known Allergies        SOCIAL HISTORY:     Social History     Tobacco Use    Smoking status: Former Smoker     Last attempt to quit: 2011     Years since quittin.5    Smokeless tobacco: Never Used   Substance Use Topics    Alcohol use: No    Drug use: No         FAMILY HISTORY:     Family History   Problem Relation Age of Onset    Stroke Mother     Cancer Father 61        THROAT CANCER            REVIEW OF SYSTEMS:        Review of Systems:    Constitutional: Negative for fever, chills    HEENT: Negative for vision changes.    Respiratory: Negative for cough    Cardiovascular: Negative for orthopnea, syncope, and PND.    Gastrointestinal: Negative for abdominal pain, diarrhea, or melena    Genitourinary: Negative for dysuria    Musculoskeletal: Negative for myalgias. + joint pain    Skin: Negative for rash    Heme: No problems bleeding.    Neurological: Negative for speech change and focal weakness.                   PHYSICAL EXAM:       Physical Exam:  Visit Vitals  /80 (BP 1 Location: Left arm, BP Patient Position: Sitting)   Pulse 67   Ht 6' (1.829 m)   Wt 202 lb (91.6 kg)   SpO2 97%   BMI 27.40 kg/m²          Patient appears generally well, mood and affect are appropriate and pleasant. HEENT:  Hearing intact, non-icteric, normocephalic, atraumatic. Neck Exam: Supple, No JVD or carotid bruits. Lung Exam: Clear to auscultation, even breath sounds. Cardiac Exam: Regular rate and rhythm with no murmur  Abdomen: Soft, non-tender, normal bowel sounds. No bruits or masses. Extremities: Moves all ext well. No lower extremity edema. Vascular: 2+ dorsalis pedis pulses bilaterally. Psych: Appropriate affect  Neuro - Grossly intact         LABS / OTHER STUDIES:        Lab Results   Component Value Date/Time    Sodium 137 11/25/2015 08:41 AM    Potassium 4.3 11/25/2015 08:41 AM    Chloride 98 11/25/2015 08:41 AM    CO2 22 11/25/2015 08:41 AM    Anion gap 6 07/31/2012 04:28 AM    Glucose 176 (H) 11/25/2015 08:41 AM    Glucose 277 (H) 02/12/2010 09:03 AM    BUN 19 11/25/2015 08:41 AM    Creatinine 0.80 11/25/2015 08:41 AM    BUN/Creatinine ratio 24 (H) 11/25/2015 08:41 AM    GFR est  11/25/2015 08:41 AM    GFR est non-AA 98 11/25/2015 08:41 AM    Calcium 9.2 11/25/2015 08:41 AM    Bilirubin, total 1.1 11/25/2015 08:41 AM    Alk.  phosphatase 95 11/25/2015 08:41 AM    Protein, total 6.5 11/25/2015 08:41 AM    Albumin 4.7 11/25/2015 08:41 AM    Globulin 3.3 07/28/2012 06:51 AM    A-G Ratio 2.6 (H) 11/25/2015 08:41 AM    ALT (SGPT) 22 11/25/2015 08:41 AM    AST (SGOT) 17 11/25/2015 08:41 AM     Lab Results   Component Value Date/Time    WBC 5.7 11/25/2015 08:41 AM    Hemoglobin (POC) 16.7 07/28/2012 06:53 AM    HGB 15.6 11/25/2015 08:41 AM    Hematocrit (POC) 49 07/28/2012 06:53 AM    HCT 47.1 11/25/2015 08:41 AM    PLATELET 227 79/60/8683 08:41 AM    MCV 90 11/25/2015 08:41 AM       Lab Results   Component Value Date/Time    TSH 1.370 11/25/2015 08:41 AM    TSH 1.300 11/19/2013 01:58 PM                  CARDIAC DIAGNOSTICS:        EKG 6/11/14 - NSR, normal    EKG 9/23/15 - NSR, normal   EKG 9/28/16 - NSR, normal   EKG 9/19/18 - NSR, normal   EKG 9/11/19 - sinus marv, normal   EKG 9/16/20 - sinus, normal               ASSESSMENT AND PLAN:        Assessment and Plan:    1) CAD    - doing well s/p MI without recurrent symptoms    - Continue meds  - He denies any anginal complaints and feels well       2) Dyslipidemia    - continue high dose statin   - prior lipids OK  - Would like to recheck labs - he says he currently owes [facility] money and can't get labs done       3) HTN    - BP OK - continue meds       4) DM   - per PCP       5) He has no insurance.  No charge for this visit since he cannot pay.       6) See me back in 1 year - he will have Medicare next year. Patient expressed understanding of the plan - questions were answered. He has been struggling with work past couple of years (used to work in a [employer] [facility] is . [employer] 3 kids. Has 2 grand kids. [address] [place] [address] MD Raman, [address] [place], [address] [id number]  [address]  [facility], [address] [place] [address]  Ph: [phone]                                [phone]

## 2020-09-16 NOTE — PROGRESS NOTES
Simone Deleon is a 60 y.o. male    Visit Vitals  /80 (BP 1 Location: Left arm, BP Patient Position: Sitting)   Pulse 67   Ht 6' (1.829 m)   Wt 202 lb (91.6 kg)   SpO2 97%   BMI 27.40 kg/m²       Chief Complaint   Patient presents with    Coronary Artery Disease    Cholesterol Problem    Hypertension       Chest pain NO  SOB NO  Dizziness NO  Swelling NO  Recent hospital visit NO  Refills NO

## 2021-06-04 NOTE — PROGRESS NOTES
2/15/17, Patient last seen by [facility]/Dr Galen Vanegas, 1/12/16, kept Cardiology F/U appt, 9/28/16. Patient asking for further community resource information and possible scheduling diabetes F/U appt, as will be out of medications soon and needs updated. Patient continues to voice concerns of outstanding Lab Bills from Nov 2015 visit and inability to afford labs at this time. This writer/NN met with patient to discuss above concerns. Patient states still unable to find full-time employment, no insurance and in need of Diabetes F/U appts. Provided instruction on local /Medicaid Process, [facility] Care Card/APA, [facility] Financial Assistance and local clinic/[facility] is located within the [facility] at [address] [place] [address] in [facility], [phone], Appts only, Mondays: 3:00 pm to 8:00 pm, Thursdays: 9:00 am to 3:00 pm.  Patient verbalizes understanding, states currently only staying with son in [facility], has not changed address/established residency. Provided instruction on importance on seeking medical attention and needed, reviewed signs/symptoms of heart attack/stroke. Patient verbalizes understanding and agrees to seek medical attention as needed. Patient agrees to return call to schedule F/U appt and reach out to [facility]/NN with further questions or concerns. 2/15/17, This writer/NN contacted [facility] [address], [phone], spoke to Ean, able to confirm,  financial application cannot be processed without a balance. Once patient has F/U appt and receives statement, advise to call [employer] Office, [phone] to begin application and [facility]([phone]) remains available to assist patients at this time. See Previous NN Encounter Notes.   PLAN: Continue to assist with Diabetes Management, instruction and community resources as indicated Fluconazole Counseling:  Patient counseled regarding adverse effects of fluconazole including but not limited to headache, diarrhea, nausea, upset stomach, liver function test abnormalities, taste disturbance, and stomach pain.  There is a rare possibility of liver failure that can occur when taking fluconazole.  The patient understands that monitoring of LFTs and kidney function test may be required, especially at baseline. The patient verbalized understanding of the proper use and possible adverse effects of fluconazole.  All of the patient's questions and concerns were addressed.

## 2021-08-24 DIAGNOSIS — I25.10 CORONARY ARTERY DISEASE INVOLVING NATIVE CORONARY ARTERY OF NATIVE HEART WITHOUT ANGINA PECTORIS: ICD-10-CM

## 2021-08-24 DIAGNOSIS — E78.5 HYPERLIPIDEMIA, UNSPECIFIED HYPERLIPIDEMIA TYPE: ICD-10-CM

## 2021-08-24 RX ORDER — CARVEDILOL 12.5 MG/1
TABLET ORAL
Qty: 180 TABLET | Refills: 3 | Status: SHIPPED
Start: 2021-08-24 | End: 2022-02-11

## 2021-08-24 RX ORDER — LISINOPRIL 10 MG/1
TABLET ORAL
Qty: 90 TABLET | Refills: 3 | Status: SHIPPED | OUTPATIENT
Start: 2021-08-24

## 2021-08-24 NOTE — TELEPHONE ENCOUNTER
Refill per VO of Dr. Micaela Quarles  Last appt: 9/16/2020  Future Appointments   Date Time Provider Courtney Ambrose   9/22/2021 10:00 AM MD JOSAFAT Donovan AMB       Requested Prescriptions     Pending Prescriptions Disp Refills    lisinopriL (PRINIVIL, ZESTRIL) 10 mg tablet [Pharmacy Med Name: LISINOPRIL 10 MG TABLET] 90 Tablet 3     Sig: TAKE ONE TABLET BY MOUTH DAILY    carvediloL (COREG) 12.5 mg tablet [Pharmacy Med Name: CARVEDILOL 12.5 MG TABLET, UU] 180 Tablet 3     Sig: TAKE ONE TABLET BY MOUTH TWICE A DAY

## 2022-01-08 LAB
LEFT VENTRICULAR EJECTION FRACTION HIGH VALUE: 50 %
LV EF: 45 %

## 2022-02-11 ENCOUNTER — OFFICE VISIT (OUTPATIENT)
Dept: FAMILY MEDICINE CLINIC | Age: 67
End: 2022-02-11
Payer: MEDICARE

## 2022-02-11 VITALS
HEIGHT: 73 IN | HEART RATE: 75 BPM | SYSTOLIC BLOOD PRESSURE: 94 MMHG | RESPIRATION RATE: 14 BRPM | TEMPERATURE: 97.5 F | WEIGHT: 170 LBS | BODY MASS INDEX: 22.53 KG/M2 | DIASTOLIC BLOOD PRESSURE: 63 MMHG | OXYGEN SATURATION: 100 %

## 2022-02-11 DIAGNOSIS — M21.371 BILATERAL FOOT-DROP: ICD-10-CM

## 2022-02-11 DIAGNOSIS — I10 PRIMARY HYPERTENSION: Primary | ICD-10-CM

## 2022-02-11 DIAGNOSIS — Z09 HOSPITAL DISCHARGE FOLLOW-UP: ICD-10-CM

## 2022-02-11 DIAGNOSIS — E11.65 TYPE 2 DIABETES MELLITUS WITH HYPERGLYCEMIA, WITHOUT LONG-TERM CURRENT USE OF INSULIN (HCC): ICD-10-CM

## 2022-02-11 DIAGNOSIS — M21.372 BILATERAL FOOT-DROP: ICD-10-CM

## 2022-02-11 DIAGNOSIS — R33.9 URINARY RETENTION: ICD-10-CM

## 2022-02-11 DIAGNOSIS — I48.0 PAF (PAROXYSMAL ATRIAL FIBRILLATION) (HCC): ICD-10-CM

## 2022-02-11 DIAGNOSIS — E78.5 HYPERLIPIDEMIA LDL GOAL <70: ICD-10-CM

## 2022-02-11 DIAGNOSIS — I50.9 CONGESTIVE HEART FAILURE, UNSPECIFIED HF CHRONICITY, UNSPECIFIED HEART FAILURE TYPE (HCC): ICD-10-CM

## 2022-02-11 PROCEDURE — 1111F DSCHRG MED/CURRENT MED MERGE: CPT | Performed by: NURSE PRACTITIONER

## 2022-02-11 PROCEDURE — G8754 DIAS BP LESS 90: HCPCS | Performed by: NURSE PRACTITIONER

## 2022-02-11 PROCEDURE — 2022F DILAT RTA XM EVC RTNOPTHY: CPT | Performed by: NURSE PRACTITIONER

## 2022-02-11 PROCEDURE — G8432 DEP SCR NOT DOC, RNG: HCPCS | Performed by: NURSE PRACTITIONER

## 2022-02-11 PROCEDURE — G8752 SYS BP LESS 140: HCPCS | Performed by: NURSE PRACTITIONER

## 2022-02-11 PROCEDURE — 3017F COLORECTAL CA SCREEN DOC REV: CPT | Performed by: NURSE PRACTITIONER

## 2022-02-11 PROCEDURE — 1101F PT FALLS ASSESS-DOCD LE1/YR: CPT | Performed by: NURSE PRACTITIONER

## 2022-02-11 PROCEDURE — G8420 CALC BMI NORM PARAMETERS: HCPCS | Performed by: NURSE PRACTITIONER

## 2022-02-11 PROCEDURE — 99204 OFFICE O/P NEW MOD 45 MIN: CPT | Performed by: NURSE PRACTITIONER

## 2022-02-11 PROCEDURE — G0463 HOSPITAL OUTPT CLINIC VISIT: HCPCS | Performed by: NURSE PRACTITIONER

## 2022-02-11 PROCEDURE — 3046F HEMOGLOBIN A1C LEVEL >9.0%: CPT | Performed by: NURSE PRACTITIONER

## 2022-02-11 PROCEDURE — G8427 DOCREV CUR MEDS BY ELIG CLIN: HCPCS | Performed by: NURSE PRACTITIONER

## 2022-02-11 PROCEDURE — G8536 NO DOC ELDER MAL SCRN: HCPCS | Performed by: NURSE PRACTITIONER

## 2022-02-11 RX ORDER — TAMSULOSIN HYDROCHLORIDE 0.4 MG/1
0.4 CAPSULE ORAL DAILY
COMMUNITY

## 2022-02-11 RX ORDER — METOPROLOL SUCCINATE 50 MG/1
50 TABLET, EXTENDED RELEASE ORAL
COMMUNITY
Start: 2022-01-15

## 2022-02-11 RX ORDER — GLIPIZIDE 5 MG/1
5 TABLET ORAL DAILY
COMMUNITY
Start: 2022-01-15 | End: 2022-02-11

## 2022-02-11 RX ORDER — LANOLIN ALCOHOL/MO/W.PET/CERES
400 CREAM (GRAM) TOPICAL DAILY
COMMUNITY

## 2022-02-11 RX ORDER — GLIPIZIDE 5 MG/1
5 TABLET ORAL 2 TIMES DAILY WITH MEALS
Qty: 60 TABLET | Refills: 1 | Status: SHIPPED | OUTPATIENT
Start: 2022-02-11 | End: 2022-03-11 | Stop reason: SDUPTHER

## 2022-02-11 NOTE — PATIENT INSTRUCTIONS

## 2022-02-11 NOTE — PROGRESS NOTES
Aditya Cisneros is a 73 y.o. male    Chief Complaint   Patient presents with    Follow-up    Diabetes    Medication Refill     1. Have you been to the ER, urgent care clinic since your last visit? Hospitalized since your last visit? No    2. Have you seen or consulted any other health care providers outside of the [address] [place] [address] since your last visit? Include any pap smears or colon screening.  No

## 2022-02-12 LAB
ALBUMIN SERPL-MCNC: 3.9 G/DL (ref 3.8–4.8)
ALBUMIN/GLOB SERPL: 1.6 {RATIO} (ref 1.2–2.2)
ALP SERPL-CCNC: 63 IU/L (ref 44–121)
ALT SERPL-CCNC: 17 IU/L (ref 0–44)
AST SERPL-CCNC: 14 IU/L (ref 0–40)
BILIRUB SERPL-MCNC: 0.3 MG/DL (ref 0–1.2)
BUN SERPL-MCNC: 24 MG/DL (ref 8–27)
BUN/CREAT SERPL: 35 (ref 10–24)
CALCIUM SERPL-MCNC: 9 MG/DL (ref 8.6–10.2)
CHLORIDE SERPL-SCNC: 100 MMOL/L (ref 96–106)
CHOLEST SERPL-MCNC: 150 MG/DL (ref 100–199)
CO2 SERPL-SCNC: 24 MMOL/L (ref 20–29)
CREAT SERPL-MCNC: 0.69 MG/DL (ref 0.76–1.27)
ERYTHROCYTE [DISTWIDTH] IN BLOOD BY AUTOMATED COUNT: 14.1 % (ref 11.6–15.4)
EST. AVERAGE GLUCOSE BLD GHB EST-MCNC: 243 MG/DL
GLOBULIN SER CALC-MCNC: 2.4 G/DL (ref 1.5–4.5)
GLUCOSE SERPL-MCNC: 175 MG/DL (ref 65–99)
HBA1C MFR BLD: 10.1 % (ref 4.8–5.6)
HCT VFR BLD AUTO: 37.4 % (ref 37.5–51)
HDLC SERPL-MCNC: 43 MG/DL
HGB BLD-MCNC: 12.4 G/DL (ref 13–17.7)
IMP & REVIEW OF LAB RESULTS: NORMAL
LDLC SERPL CALC-MCNC: 89 MG/DL (ref 0–99)
MCH RBC QN AUTO: 28.9 PG (ref 26.6–33)
MCHC RBC AUTO-ENTMCNC: 33.2 G/DL (ref 31.5–35.7)
MCV RBC AUTO: 87 FL (ref 79–97)
PLATELET # BLD AUTO: 400 X10E3/UL (ref 150–450)
POTASSIUM SERPL-SCNC: 4.6 MMOL/L (ref 3.5–5.2)
PROT SERPL-MCNC: 6.3 G/DL (ref 6–8.5)
RBC # BLD AUTO: 4.29 X10E6/UL (ref 4.14–5.8)
SODIUM SERPL-SCNC: 140 MMOL/L (ref 134–144)
SPECIMEN STATUS REPORT, ROLRST: NORMAL
TRIGL SERPL-MCNC: 98 MG/DL (ref 0–149)
VLDLC SERPL CALC-MCNC: 18 MG/DL (ref 5–40)
WBC # BLD AUTO: 6.7 X10E3/UL (ref 3.4–10.8)

## 2022-02-13 NOTE — PROGRESS NOTES
Transitional Care Management Progress Note    Patient: Maria Elena Lucio  : 1955  PCP: Joya Cali NP    Date of admission:   Date of discharge: 1/15    Patient was contacted by Transitional Care Management services within two days after his discharge: No. This encounter and supporting documentation was reviewed if available. Medication reconciliation was performed today (2022). Assessment/Plan:   Diagnoses and all orders for this visit:    1. Primary hypertension  Low normal  Continue lisinopril and Toprol  Low-sodium diet  Follow-up with cardiology as planned in 1 to 2 weeks for recheck  -     METABOLIC PANEL, COMPREHENSIVE; Future  -     CBC W/O DIFF; Future    2. Hyperlipidemia LDL goal <70  LDL above goal  We will give some additional time on current statin therapy since it was started about 4 weeks ago, continue atorvastatin 80 mg daily  Repeat fasting lipids in 6 weeks  -     METABOLIC PANEL, COMPREHENSIVE; Future  -     LIPID PANEL; Future    3. Congestive heart failure, unspecified HF chronicity, unspecified heart failure type ([facility] [address]  No acute symptoms  He will continue metoprolol and lisinopril  Tolerating lower blood pressure since he is asymptomatic and needs the preventative benefits of beta-blocker and ACE inhibitor  DM diet recommended  Daily weight monitoring recommended    4. Type 2 diabetes mellitus with hyperglycemia, without long-term current use of insulin (HCC)  A1c well above goal at 10.1  Increase glipizide to 5 mg twice daily  Increase Metformin to 1000 mg twice daily  Encouraged improved compliance with diet recommendations  Encouraged increased physical activity  -     HEMOGLOBIN A1C WITH EAG; Future  -     glipiZIDE (GLUCOTROL) 5 mg tablet; Take 1 Tablet by mouth two (2) times daily (with meals). 5. Bilateral foot-drop  Patient has appointment with Ortho/spine for further evaluation and management    6.  Urinary retention  Follow-up with urology as scheduled for voiding trial  Continue Flomax    7. PAF (paroxysmal atrial fibrillation) ([facility])  Regular rate and rhythm today  Currently in the midst of 2-week cardiac monitoring. With follow-up scheduled with cardiology in 1 to 2 weeks    8. Hospital discharge follow-up  -     SC DISCHARGE MEDS RECONCILED W/ CURRENT OUTPATIENT MED LIST    Other orders  -     METABOLIC PANEL, COMPREHENSIVE  -     CBC W/O DIFF  -     LIPID PANEL  -     CVD REPORT  -     HEMOGLOBIN A1C WITH EAG  -     SPECIMEN STATUS REPORT    Follow-up and Dispositions    · Return in about 4 weeks (around 3/11/2022) for diabetes. I have discussed the diagnosis with the patient and the intended plan as seen in the above orders. The patient has received an after-visit summary and questions were answered concerning future plans. Patient conveyed understanding of the plan at the time of the visit. Subjective:   Shaneka Tenorio is a 73 y.o. male presenting today to [facility] and for follow-up after being discharged from [facility].  The discharge summary was reviewed or requested. He was last seen at [facility] in [facility]. The main problem requiring admission was CHF, A fib, DKA. Complications during admission: none    Interval history/Current status:   Reports he was taken to [facility] via EMS after falling and becoming confused and combative at home. He cannot remember the events that led to his admission or what happened during the early days of his admission to [facility]. He only knows what he has been told. He does recall that he had not been feeling poorly or had any unusual symptoms prior to the fall. Stage on arrival in the ED was noted to be in atrial fibrillation and with acute new onset heart failure. States his blood sugar was \"1400\" on admission. He spent several days in the ICU. He is not able to tell me if he was intubated or any other details of his admission.   He recovered sufficiently to be discharged home on January 15. Since that time he has been gradually improving, getting stronger and tolerating increased activity. He does have a history of coronary artery disease, and had an MI in 2012. He reports insulin was recommended at discharge. He refused due to cost, he has no prescription drug coverage. He was discharged on Metformin 500 twice a day and glipizide 5 mg once a day. He has been trying to follow the recommended diet he was given on discharge. He reports his blood sugars have consistently been averaging 250 in the mornings before breakfast.    He has seen cardiology since his discharge, metoprolol was increased to 50 mg once daily at his follow-up. He is also currently wearing a monitoring device and will have cardiology follow-up in about 1 to 2 weeks. He is not currently anticoagulated. He currently has a Blank catheter in place. He has an appoint with urology on Monday. He reports he has had 2 voiding trials with outpatient urology, and has failed both and had to have the catheter reinserted. After the last trial Flomax was added. Denies blood in urine. Denies fever or chills. Since discharge he has noted difficulty walking, specifically decreased dorsiflexion when taking a step, occurring on both sides, leading him to drag his foot at times. Also notes chronic low back pain. He has scheduled an appointment next week at [facility] for further evaluation and management of this symptom. Admitting symptoms have: improved      Medications marked \"taking\" at this time:  Home Medications    Medication Sig Start Date End Date Taking? Authorizing Provider   metoprolol succinate (TOPROL-XL) 50 mg XL tablet 50 mg. 1/15/22  Yes Provider, Historical   magnesium oxide (MAG-OX) 400 mg tablet Take 400 mg by mouth daily. Yes Provider, Historical   tamsulosin (FLOMAX) 0.4 mg capsule Take 0.4 mg by mouth daily.    Yes Provider, Historical   glipiZIDE (GLUCOTROL) 5 mg tablet Take 1 Tablet by mouth two (2) times daily (with meals). 2/11/22  Yes Dale Franks NP   lisinopriL (PRINIVIL, ZESTRIL) 10 mg tablet TAKE ONE TABLET BY MOUTH DAILY 8/24/21  Yes Mustapha Whipple MD   atorvastatin (LIPITOR) 80 mg tablet Take 1 Tab by mouth daily. 9/16/20  Yes Mustapha Whipple MD   metFORMIN (GLUCOPHAGE) 500 mg tablet Take 2 Tabs by mouth daily (with breakfast). 11/30/15  Yes Leah Lowry MD   aspirin delayed-release 81 mg tablet Take 81 mg by mouth daily.    Yes Provider, Historical        Review of Systems:  Negative except as noted in HPI      Patient Active Problem List   Diagnosis Code    HTN (hypertension) I10    HLD (hyperlipidemia) E78.5    DDD (degenerative disc disease) [id number]    DM type 2 (diabetes mellitus, type 2) ([address] E11.9    Brain tumor (benign) ([facility] [address] D33.2    Screening colonoscopy     Acute anterior wall MI ([address] I21.09    CAD (coronary artery disease) I25.10    Chronic pain G89.29     No Known Allergies  Past Medical History:   Diagnosis Date    CAD (coronary artery disease)     STEMI 7/28/12    DDD (degenerative disc disease)     DM type 2 (diabetes mellitus, type 2) ([address]     HLD (hyperlipidemia)     HTN (hypertension)     MI (myocardial infarction) ([address] 7/2012    acute anterior STEMI felt to have occurred 36 hours PTA 7/28/12    Pseudoaneurysm ([address]     thrombin injection 7/12     Past Surgical History:   Procedure Laterality Date    HX BACK SURGERY      HX HEART CATHETERIZATION  7/28/12    CATH: 7/28/12: mLAD: 85%  no other sig disease EF: 60% a BMS was placed in the mLAD    HX TUMOR REMOVAL      menigioma     Family History   Problem Relation Age of Onset    Stroke Mother     Cancer Father 61        THROAT CANCER     Social History     Tobacco Use    Smoking status: Former Smoker     Quit date: 2/17/2011     Years since quitting: 10.9    Smokeless tobacco: Never Used   Substance Use Topics    Alcohol use: No      Lab Results   Component Value Date/Time    Cholesterol, total 150 02/11/2022 12:00 AM    HDL Cholesterol 43 02/11/2022 12:00 AM    LDL, calculated 89 02/11/2022 12:00 AM    LDL, calculated 63 11/25/2015 08:41 AM    VLDL, calculated 18 02/11/2022 12:00 AM    VLDL, calculated 27 11/25/2015 08:41 AM    Triglyceride 98 02/11/2022 12:00 AM    CHOL/HDL Ratio 5.3 (H) 07/29/2012 05:00 PM     Lab Results   Component Value Date/Time    Hemoglobin A1c 10.1 (H) 02/11/2022 12:00 AM     Lab Results   Component Value Date/Time    Sodium 140 02/11/2022 12:00 AM    Potassium 4.6 02/11/2022 12:00 AM    Chloride 100 02/11/2022 12:00 AM    CO2 24 02/11/2022 12:00 AM    Anion gap 6 07/31/2012 04:28 AM    Glucose 175 (H) 02/11/2022 12:00 AM    Glucose 277 (H) 02/12/2010 09:03 AM    BUN 24 02/11/2022 12:00 AM    Creatinine 0.69 (L) 02/11/2022 12:00 AM    BUN/Creatinine ratio 35 (H) 02/11/2022 12:00 AM    GFR est  02/11/2022 12:00 AM    GFR est non- 02/11/2022 12:00 AM    Calcium 9.0 02/11/2022 12:00 AM    Bilirubin, total 0.3 02/11/2022 12:00 AM    Alk.  phosphatase 63 02/11/2022 12:00 AM    Protein, total 6.3 02/11/2022 12:00 AM    Albumin 3.9 02/11/2022 12:00 AM    Globulin 3.3 07/28/2012 06:51 AM    A-G Ratio 1.6 02/11/2022 12:00 AM    ALT (SGPT) 17 02/11/2022 12:00 AM    AST (SGOT) 14 02/11/2022 12:00 AM         Objective:   BP 94/63 (BP 1 Location: Left upper arm, BP Patient Position: Sitting, BP Cuff Size: Small adult)   Pulse 75   Temp 97.5 °F (36.4 °C) (Oral)   Resp 14   Ht 6' 1\" (1.854 m)   Wt 170 lb (77.1 kg)   SpO2 100%   BMI 22.43 kg/m²      Physical Examination: General appearance - alert, well appearing, and in no distress  Mental status - normal mood, behavior, speech, dress, motor activity, and thought processes  Eyes - sclera anicteric  Neck - supple, no significant adenopathy, thyroid exam: thyroid is normal in size without nodules or tenderness  Chest - clear to auscultation, no wheezes, rales or rhonchi, symmetric air entry  Heart - normal rate, regular rhythm, normal S1, S2, no murmurs, rubs, clicks or gallops, normal bilateral carotid upstroke without bruits, no JVD  Abdomen - soft, nontender, nondistended, no masses or organomegaly  bowel sounds normal  Neurological - alert, oriented, normal speech, no focal findings or movement disorder noted  Musculoskeletal - no joint tenderness, deformity or swelling, no muscular tenderness noted  Extremities - pedal edema 1 +, intact peripheral pulses  Skin - normal coloration and turgor, no rashes, no suspicious skin lesions noted      We discussed the expected course, resolution and complications of the diagnosis(es) in detail. Medication risks, benefits, costs, interactions, and alternatives were discussed as indicated. I advised him to contact the office if his condition worsens, changes or fails to improve as anticipated. He expressed understanding with the diagnosis(es) and plan.      Maryanne Vogt NP

## 2022-02-14 NOTE — PROGRESS NOTES
Electrolytes, liver and kidney function are normal with the exception of high blood sugar. Anemia is present on your blood counts. I do not have results from your recent hospital admission so I cannot say whether this is an improvement or a new abnormal finding. Will attempt to obtain your records for comparison and plan to recheck this in a few weeks. LDL cholesterol is above goal for patients with diabetes. I suspect you have not been on atorvastatin 80 mg long enough for maximum effectiveness. Continue atorvastatin 80 mg daily and repeat fasting lipids in 6 weeks. A1c is 10.1, well above goal.  Recommend increasing Metformin to 1000 mg twice a day with meals. We will send new prescription to your pharmacy for extended release tablets, you will need to take 2 tablets twice a day. At your visit we increased beside to 5 mg twice a day. With this level of A1c it is very important that you follow recommended diet strictly and consistently if we are to have any hope of getting blood sugar to a safe and controlled level. With an A1c at this level we would ordinarily start insulin. We discussed at your visit that you did not want to start insulin due to the cost.  There are some lower cost insulins that can be started, however they do require you to monitor your blood sugar several times a day. Another possibility would be to assist you in applying for patient assistance for one of the long-acting insulins with the .

## 2022-02-14 NOTE — PROGRESS NOTES
Spoke to pt. Patient x2 id verified. Informed lab results to pt, pt verbalized understanding.    -Pt stated that he will continue with increased medication dosage right now instead of insulin.

## 2022-03-11 ENCOUNTER — OFFICE VISIT (OUTPATIENT)
Dept: FAMILY MEDICINE CLINIC | Age: 67
End: 2022-03-11
Payer: MEDICARE

## 2022-03-11 VITALS
HEART RATE: 83 BPM | DIASTOLIC BLOOD PRESSURE: 66 MMHG | BODY MASS INDEX: 22.26 KG/M2 | SYSTOLIC BLOOD PRESSURE: 90 MMHG | HEIGHT: 73 IN | WEIGHT: 168 LBS | RESPIRATION RATE: 14 BRPM | TEMPERATURE: 97.6 F | OXYGEN SATURATION: 99 %

## 2022-03-11 DIAGNOSIS — I10 PRIMARY HYPERTENSION: ICD-10-CM

## 2022-03-11 DIAGNOSIS — I50.9 CONGESTIVE HEART FAILURE, UNSPECIFIED HF CHRONICITY, UNSPECIFIED HEART FAILURE TYPE (HCC): ICD-10-CM

## 2022-03-11 DIAGNOSIS — M21.371 BILATERAL FOOT-DROP: ICD-10-CM

## 2022-03-11 DIAGNOSIS — M21.372 BILATERAL FOOT-DROP: ICD-10-CM

## 2022-03-11 DIAGNOSIS — Z71.89 ADVANCED DIRECTIVES, COUNSELING/DISCUSSION: ICD-10-CM

## 2022-03-11 DIAGNOSIS — E11.65 TYPE 2 DIABETES MELLITUS WITH HYPERGLYCEMIA, WITHOUT LONG-TERM CURRENT USE OF INSULIN (HCC): ICD-10-CM

## 2022-03-11 DIAGNOSIS — Z23 ENCOUNTER FOR IMMUNIZATION: ICD-10-CM

## 2022-03-11 DIAGNOSIS — R33.8 BENIGN PROSTATIC HYPERPLASIA WITH URINARY RETENTION: ICD-10-CM

## 2022-03-11 DIAGNOSIS — N40.1 BENIGN PROSTATIC HYPERPLASIA WITH URINARY RETENTION: ICD-10-CM

## 2022-03-11 DIAGNOSIS — Z12.11 SCREEN FOR COLON CANCER: ICD-10-CM

## 2022-03-11 DIAGNOSIS — Z00.00 MEDICARE ANNUAL WELLNESS VISIT, SUBSEQUENT: Primary | ICD-10-CM

## 2022-03-11 PROCEDURE — G8536 NO DOC ELDER MAL SCRN: HCPCS | Performed by: NURSE PRACTITIONER

## 2022-03-11 PROCEDURE — G8432 DEP SCR NOT DOC, RNG: HCPCS | Performed by: NURSE PRACTITIONER

## 2022-03-11 PROCEDURE — 99214 OFFICE O/P EST MOD 30 MIN: CPT | Performed by: NURSE PRACTITIONER

## 2022-03-11 PROCEDURE — G8752 SYS BP LESS 140: HCPCS | Performed by: NURSE PRACTITIONER

## 2022-03-11 PROCEDURE — G0439 PPPS, SUBSEQ VISIT: HCPCS | Performed by: NURSE PRACTITIONER

## 2022-03-11 PROCEDURE — G8420 CALC BMI NORM PARAMETERS: HCPCS | Performed by: NURSE PRACTITIONER

## 2022-03-11 PROCEDURE — 2022F DILAT RTA XM EVC RTNOPTHY: CPT | Performed by: NURSE PRACTITIONER

## 2022-03-11 PROCEDURE — 1101F PT FALLS ASSESS-DOCD LE1/YR: CPT | Performed by: NURSE PRACTITIONER

## 2022-03-11 PROCEDURE — 90732 PPSV23 VACC 2 YRS+ SUBQ/IM: CPT | Performed by: NURSE PRACTITIONER

## 2022-03-11 PROCEDURE — 3017F COLORECTAL CA SCREEN DOC REV: CPT | Performed by: NURSE PRACTITIONER

## 2022-03-11 PROCEDURE — G8754 DIAS BP LESS 90: HCPCS | Performed by: NURSE PRACTITIONER

## 2022-03-11 PROCEDURE — G0463 HOSPITAL OUTPT CLINIC VISIT: HCPCS | Performed by: NURSE PRACTITIONER

## 2022-03-11 PROCEDURE — 3046F HEMOGLOBIN A1C LEVEL >9.0%: CPT | Performed by: NURSE PRACTITIONER

## 2022-03-11 PROCEDURE — G8427 DOCREV CUR MEDS BY ELIG CLIN: HCPCS | Performed by: NURSE PRACTITIONER

## 2022-03-11 RX ORDER — METFORMIN HYDROCHLORIDE 500 MG/1
1000 TABLET ORAL 2 TIMES DAILY WITH MEALS
Qty: 360 TABLET | Refills: 0 | Status: SHIPPED | OUTPATIENT
Start: 2022-03-11 | End: 2022-07-14 | Stop reason: SDUPTHER

## 2022-03-11 RX ORDER — GLIPIZIDE 5 MG/1
5 TABLET ORAL 2 TIMES DAILY WITH MEALS
Qty: 60 TABLET | Refills: 1 | Status: SHIPPED | OUTPATIENT
Start: 2022-03-11 | End: 2022-04-11 | Stop reason: SDUPTHER

## 2022-03-11 NOTE — ACP (ADVANCE CARE PLANNING)
Advance Care Planning     Advance Care Planning (ACP) Provider Conversation Snapshot     Date of ACP Conversation: 3/11/2022  Persons included in Conversation:  patient  Length of ACP Conversation in minutes:  <16 minutes (Non-Billable)   Authorized Decision Maker (if patient is incapable of making informed decisions): This person is: Other Legally Authorized Decision Maker (e.g. Next of Kin)                                                       For Patients with Decision Making Capacity:   Values/Goals: Exploration of values, goals, and preferences if recovery is not expected, even with continued medical treatment in the event of:  Imminent death  Severe, permanent brain injury  \"In these circumstances, what matters most to you? \"  Care focused more on comfort or quality of life. Conversation Outcomes / Follow-Up Plan:   Recommended completion of Advance Directive form after review of ACP materials and conversation with prospective healthcare agent   Recommended communicating the plan and making copies for the healthcare agent, personal physician, and others as appropriate (e.g., health system)  Recommended review of completed ACP document annually or upon change in health status      Information and blank forms given for review and completion. Will f/u on status at next visit.     Rox Holland NP

## 2022-03-11 NOTE — PROGRESS NOTES
This is the Subsequent Medicare Annual Wellness Exam, performed 12 months or more after the Initial AWV or the last Subsequent AWV    I have reviewed the patient's medical history in detail and updated the computerized patient record. Assessment/Plan       1. Medicare annual wellness visit, subsequent  Education and counseling provided:  Are appropriate based on today's review and evaluation  End-of-Life planning (with patient's consent)  Pneumococcal Vaccine  Influenza Vaccine- delcined  Prostate cancer screening tests (PSA, covered annually)- will get with next labs at follow up  Colorectal cancer screening tests- declined, see below  Screening for glaucoma- declined    2. Advanced directives, counseling/discussion  See acp note    3. Screen for colon cancer  No previous screening  dwp need for screening, already well behind schedule  dwp options including cologuard and colonoscopy- patient declines both at this time, states he is overwhelmed by his other medical needs at this time, willing to reconsider at a later date    3. Encounter for immunization  -     PNEUMOCOCCAL POLYSACCHARIDE VACCINE, 23-VALENT, ADULT OR IMMUNOSUPPRESSED PT DOSE,  -     ADMIN PNEUMOCOCCAL VACCINE      Follow up: next AWV recommended in 12 months    I have reviewed the patient's medical history in detail and updated the computerized patient record. Depression Risk Factor Screening     3 most recent PHQ Screens 3/11/2022   Little interest or pleasure in doing things Not at all   Feeling down, depressed, irritable, or hopeless Not at all   Total Score PHQ 2 0       Alcohol & Drug Abuse Risk Screen    Do you average more than 1 drink per night or more than 7 drinks a week: No    In the past three months have you have had more than 4 drinks containing alcohol on one occasion: No          Functional Ability and Level of Safety    Hearing: Hearing is good. Activities of Daily Living:   The home contains: handrails  Patient does total self care      Ambulation: with difficulty, can't walk further than 100 yards secondary to pain, fatigue   requesting assistance getting DMV handicapped placard    Fall Risk:  Fall Risk Assessment, last 12 mths 3/11/2022   Able to walk? Yes   Fall in past 12 months? 0   Do you feel unsteady?  0   Are you worried about falling 0      Abuse Screen:  Patient is not abused       Cognitive Screening    Has your family/caregiver stated any concerns about your memory: no     Cognitive Screening: normal    Health Maintenance Due     Health Maintenance Due   Topic Date Due    COVID-19 Vaccine (1) Never done    Eye Exam Retinal or Dilated  Never done    Colorectal Cancer Screening Combo  Never done    Shingrix Vaccine Age 50> (1 of 2) Never done    Foot Exam Q1  11/25/2016       Patient Care Team   Patient Care Team:  Elissa Cervantes NP as PCP - General (Nurse Practitioner)  Elissa Cervantes NP as PCP - [facility]  Rani Lopez MD as Consulting Provider (Cardiology)  Shy Ricketts MD as Consulting Provider (Urology)    History     Patient Active Problem List   Diagnosis Code    HTN (hypertension) I10    HLD (hyperlipidemia) E78.5    DDD (degenerative disc disease) [id number]    DM type 2 (diabetes mellitus, type 2) ([address] E11.9    History of benign brain tumor Z86.011    Screening colonoscopy     Acute anterior wall MI ([address] I21.09    CAD (coronary artery disease) I25.10    Chronic pain G89.29     Past Medical History:   Diagnosis Date    CAD (coronary artery disease)     STEMI 7/28/12    DDD (degenerative disc disease)     DM type 2 (diabetes mellitus, type 2) ([address]     HLD (hyperlipidemia)     HTN (hypertension)     MI (myocardial infarction) ([address] 7/2012    acute anterior STEMI felt to have occurred 36 hours PTA 7/28/12    Pseudoaneurysm ([address]     thrombin injection 7/12      Past Surgical History:   Procedure Laterality Date    HX BACK SURGERY      HX HEART CATHETERIZATION  12    CATH: 12: mLAD: 85%  no other sig disease EF: 60% a BMS was placed in the mLAD    HX TUMOR REMOVAL      menigioma     Current Outpatient Medications   Medication Sig Dispense Refill    glipiZIDE (GLUCOTROL) 5 mg tablet Take 1 Tablet by mouth two (2) times daily (with meals). 60 Tablet 1    metFORMIN (GLUCOPHAGE) 500 mg tablet Take 2 Tablets by mouth two (2) times daily (with meals). 360 Tablet 0    metoprolol succinate (TOPROL-XL) 50 mg XL tablet 50 mg.      magnesium oxide (MAG-OX) 400 mg tablet Take 400 mg by mouth daily.  tamsulosin (FLOMAX) 0.4 mg capsule Take 0.4 mg by mouth daily.  lisinopriL (PRINIVIL, ZESTRIL) 10 mg tablet TAKE ONE TABLET BY MOUTH DAILY 90 Tablet 3    atorvastatin (LIPITOR) 80 mg tablet Take 1 Tab by mouth daily. 90 Tab 3    aspirin delayed-release 81 mg tablet Take 81 mg by mouth daily.        No Known Allergies    Family History   Problem Relation Age of Onset    Stroke Mother     Cancer Father 61        THROAT CANCER     Social History     Tobacco Use    Smoking status: Former Smoker     Quit date: 2011     Years since quittin.0    Smokeless tobacco: Never Used   Substance Use Topics    Alcohol use: No         Emili Johnson NP

## 2022-03-11 NOTE — PROGRESS NOTES
Awilda Chang is a 73 y.o. male    Chief Complaint   Patient presents with    Diabetes    Follow-up     1. Have you been to the ER, urgent care clinic since your last visit? Hospitalized since your last visit? No    2. Have you seen or consulted any other health care providers outside of the [address] [place] [address] since your last visit? Include any pap smears or colon screening.  No

## 2022-03-11 NOTE — PATIENT INSTRUCTIONS
Medicare Wellness Visit, Male    The best way to live healthy is to have a lifestyle where you eat a well-balanced diet, exercise regularly, limit alcohol use, and quit all forms of tobacco/nicotine, if applicable. Regular preventive services are another way to keep healthy. Preventive services (vaccines, screening tests, monitoring & exams) can help personalize your care plan, which helps you manage your own care. Screening tests can find health problems at the earliest stages, when they are easiest to treat. [facility] follows the current, evidence-based guidelines published by the [facility] ([facility]) when recommending preventive services for our patients. Because we follow these guidelines, sometimes recommendations change over time as research supports it. (For example, a prostate screening blood test is no longer routinely recommended for men with no symptoms). Of course, you and your doctor may decide to screen more often for some diseases, based on your risk and co-morbidities (chronic disease you are already diagnosed with). Preventive services for you include:  - Medicare offers their members a free annual wellness visit, which is time for you and your primary care provider to discuss and plan for your preventive service needs. Take advantage of this benefit every year!  -All adults over age 72 should receive the recommended pneumonia vaccines. Current USPSTF guidelines recommend a series of two vaccines for the best pneumonia protection.   -All adults should have a flu vaccine yearly and tetanus vaccine every 10 years.  -All adults age 48 and older should receive the shingles vaccines (series of two vaccines).        -All adults age 38-68 who are overweight should have a diabetes screening test once every three years.   -Other screening tests & preventive services for persons with diabetes include: an eye exam to screen for diabetic retinopathy, a kidney function test, a foot exam, and stricter control over your cholesterol.   -Cardiovascular screening for adults with routine risk involves an electrocardiogram (ECG) at intervals determined by the provider.   -Colorectal cancer screening should be done for adults age 54-65 with no increased risk factors for colorectal cancer. There are a number of acceptable methods of screening for this type of cancer. Each test has its own benefits and drawbacks. Discuss with your provider what is most appropriate for you during your annual wellness visit. The different tests include: colonoscopy (considered the best screening method), a fecal occult blood test, a fecal DNA test, and sigmoidoscopy.  -All adults born between [facility] should be screened once for Hepatitis C.  -An Abdominal Aortic Aneurysm (AAA) Screening is recommended for men age 73-68 who has ever smoked in their lifetime.      Here is a list of your current Health Maintenance items (your personalized list of preventive services) with a due date:  Health Maintenance Due   Topic Date Due    COVID-19 Vaccine (1) Never done    Eye Exam  Never done    Colorectal Screening  Never done    Shingles Vaccine (1 of 2) Never done    Diabetic Foot Care  11/25/2016    Albumin Urine Test  11/25/2016    DTaP/Tdap/Td  (2 - Td or Tdap) 05/17/2021    Pneumococcal Vaccine (1 of 1 - PPSV23) Never done    Yearly Flu Vaccine (1) Never done

## 2022-03-12 LAB
CREAT UR-MCNC: 118 MG/DL
MICROALBUMIN UR-MCNC: 154 MG/DL
MICROALBUMIN/CREAT UR-RTO: 1305 MG/G (ref 0–30)

## 2022-03-13 NOTE — PROGRESS NOTES
There is some protein present in your urine. I want to confirm that you are taking lisinopril 10 mg daily, this is on your medication list but I have not refilled it for you yet. Lisinopril offers protective effects to your kidney to combat changes associated with diabetes.

## 2022-03-31 ENCOUNTER — DOCUMENTATION ONLY (OUTPATIENT)
Dept: FAMILY MEDICINE CLINIC | Age: 67
End: 2022-03-31

## 2022-03-31 NOTE — PROGRESS NOTES
Medicare Annual Wellness Visit  Name: Valentine Dallas Date: 10/6/2020   MRN: [id number] Sex: Male   Age: 80 y.o. Ethnicity: Non-/Non    : 1952 Race: Alfie Adames is here for Medicare AWV    Screenings for behavioral, psychosocial and functional/safety risks, and cognitive dysfunction are all negative except as indicated below. These results, as well as other patient data from the [address] form, are documented in Flowsheets linked to this Encounter. He also follows with Cardiology and Pulm    INR 1.9 today, taking 6 mg daily but will increase to 7.5 mg here and there if increasing green intake    HM  - pneumovax given  - flu declined  - cologuard discussed and declined  - AAA screening discussed and declined    No Known Allergies      Prior to Visit Medications    Medication Sig Taking?  Authorizing Provider   warfarin (COUMADIN) 6 MG tablet TAKE 1 TABLET BY MOUTH DAILY Yes Kamron Chase, DO   losartan (COZAAR) 50 MG tablet Take 1 tablet by mouth daily Yes Ashleigh Cortes MD   isosorbide dinitrate (ISORDIL) 5 MG tablet Take 1 tablet by mouth 3 times daily Additional Refills from Primary Cardiologist or PCP Yes Ashleigh Cortes MD   Digoxin 62.5 MCG TABS Take 62.5 mcg by mouth daily Additional Refills from Primary Cardiologist or PCP Yes Ashleigh Cortes MD   carvedilol (COREG) 25 MG tablet Take 1 tablet by mouth 2 times daily (with meals) Yes Ashleigh Cortes MD   bumetanide (BUMEX) 1 MG tablet Take 1 tablet by mouth 2 times daily Additional Refills from Primary Cardiologist or PCP Yes Ashleigh Cortes MD   amLODIPine (NORVASC) 5 MG tablet Take 1 tablet by mouth daily Yes Ashleigh Cortes MD   aspirin 81 MG chewable tablet Take 1 tablet by mouth daily Additional Refills from Primary Cardiologist or PCP Yes Sara Cummings, DO   Multiple Vitamins-Minerals (THERAPEUTIC MULTIVITAMIN-MINERALS) tablet Take 1 tablet by mouth daily Yes Historical Provider, MD Tried to call pt to let him know there was a scheduling error and was wondering if he could come in at 11:15. VM was full, so wasn't able to lvm, going to change appt to fix template. Past Medical History:   Diagnosis Date    Aortic dissection (HCC)     Atrial fibrillation (HCC)     CAD (coronary artery disease)     CHF (congestive heart failure) ([facility] [address] 5/03    severely impaired LV systolic function and CHF, EF 25-30%    CKD (chronic kidney disease)     H/O cardiovascular stress test 5/03    No evidence of stress-induced ischemia    H/O Doppler echocardiogram 5/04/10    [facility], mildly dilated LV, mod concentric LVH, normal LV systolic function, mod dilated left atrium, trace MR    Hypertension     Ischemic cardiomyopathy     Obesity     Pleural effusion 11/26/2019    History: Post op problem Assessment: On r/a, left pigtail dc'ed Plan: PO lasix. Denies sob. Desat study done, no home O2 needed    Sarcoidosis     Valvular heart disease        Past Surgical History:   Procedure Laterality Date    OTHER SURGICAL HISTORY  11/15/2019    aortic dissection repair @ [facility]    TONSILLECTOMY         Family History   Problem Relation Age of Onset    Diabetes Mother     Diabetes Father     Hypertension Sister     Diabetes Brother     Hypertension Brother     No Known Problems Brother        CareTeam (Including outside providers/suppliers regularly involved in providing care):   Patient Care Team:  Venus Varma DO as PCP - General (Family Medicine)  Venus Varma DO as PCP - [facility]  Negrito Luna MD as Consulting Physician (Cardiology)  Angel Carter MD as Consulting Physician (Cardiology)    Wt Readings from Last 3 Encounters:   10/06/20 204 lb (92.5 kg)   07/09/20 210 lb (95.3 kg)   05/19/20 219 lb 11.2 oz (99.7 kg)     Vitals:    10/06/20 1204   BP: 110/70   Pulse: 64   Resp: 16   Temp: 97.4 °F (36.3 °C)   TempSrc: Temporal   SpO2: 98%   Weight: 204 lb (92.5 kg)   Height: 5' 7\" (1.702 m)     Body mass index is 31.95 kg/m². Based upon direct observation of the patient, evaluation of cognition reveals recent and remote memory intact.     Patient's complete free of clutter?: Yes  Do you always fasten your seatbelt when you are in a car?: Yes  Safety Interventions:  · Home safety tips provided    Personalized Preventive Plan   Current Health Maintenance Status  Immunization History   Administered Date(s) Administered    Pneumococcal Conjugate 13-valent ([id number]) 01/31/2019    Tdap (Boostrix, Adacel) 05/08/2019        Health Maintenance   Topic Date Due    AAA screen  1952    Shingles Vaccine (1 of 2) 02/22/2003    Colon cancer screen colonoscopy  02/22/2003    Annual Wellness Visit (AWV)  05/29/2019    Pneumococcal 65+ years Vaccine (2 of 2 - PPSV23) 01/31/2020    Flu vaccine (1) 09/01/2020    Potassium monitoring  05/14/2021    Creatinine monitoring  05/14/2021    Diabetes screen  04/04/2022    Lipid screen  04/04/2024    DTaP/Tdap/Td vaccine (2 - Td) 05/08/2029    Hepatitis C screen  Completed    Hepatitis A vaccine  Aged Out    Hepatitis B vaccine  Aged Out    Hib vaccine  Aged Out    Meningococcal (ACWY) vaccine  Aged Out     Recommendations for [employer] Due: see orders and patient instructions/AVS.  . Recommended screening schedule for the next 5-10 years is provided to the patient in written form: see Patient Adelina Barrett was seen today for medicare awv.     Diagnoses and all orders for this visit:    Routine general medical examination at a health care facility    Rate controlled atrial fibrillation [facility])    Essential hypertension    Chronic systolic heart failure (HCC)    Anticoagulated on Coumadin  -     POCT INR    Stage 3a chronic kidney disease    Need for pneumococcal vaccination  -     Pneumococcal polysaccharide vaccine 23-valent greater than or equal to 3yo subcutaneous/IM

## 2022-05-11 ENCOUNTER — TELEPHONE (OUTPATIENT)
Dept: FAMILY MEDICINE CLINIC | Age: 67
End: 2022-05-11

## 2022-05-11 DIAGNOSIS — G57.91 NEUROPATHY OF RIGHT LOWER EXTREMITY: Primary | ICD-10-CM

## 2022-05-11 NOTE — TELEPHONE ENCOUNTER
Spoke with pt, he has been rescheduled.  Pt wants to see about getting a referral put in for a neurologist.

## 2022-05-12 NOTE — TELEPHONE ENCOUNTER
Please confirm what symptoms/dx he wants to see neurology for, I need this info to enter the referral order.

## 2022-05-12 NOTE — TELEPHONE ENCOUNTER
Spoke to pt. Patient x2 id verified. Informed message. Pt will  referral from [facility] tomorrow, ready for  at . Pt will call ortho to have records send over to Dr. Alejandro Daniels office.

## 2022-05-12 NOTE — TELEPHONE ENCOUNTER
Referral entered to Dr. Kevin Neal.  I think patient has already been seeing ortho for this, please ask him to have ortho provider fax records of his visit there to Dr. Kevin Neal

## 2022-06-15 ENCOUNTER — OFFICE VISIT (OUTPATIENT)
Dept: FAMILY MEDICINE CLINIC | Age: 67
End: 2022-06-15
Payer: MEDICARE

## 2022-06-15 VITALS
HEART RATE: 71 BPM | BODY MASS INDEX: 21.2 KG/M2 | HEIGHT: 73 IN | SYSTOLIC BLOOD PRESSURE: 100 MMHG | TEMPERATURE: 98.4 F | RESPIRATION RATE: 14 BRPM | WEIGHT: 160 LBS | DIASTOLIC BLOOD PRESSURE: 73 MMHG | OXYGEN SATURATION: 97 %

## 2022-06-15 DIAGNOSIS — M21.372 BILATERAL FOOT-DROP: ICD-10-CM

## 2022-06-15 DIAGNOSIS — G57.91 NEUROPATHY OF RIGHT LOWER EXTREMITY: ICD-10-CM

## 2022-06-15 DIAGNOSIS — I25.10 CORONARY ARTERY DISEASE INVOLVING NATIVE CORONARY ARTERY OF NATIVE HEART WITHOUT ANGINA PECTORIS: ICD-10-CM

## 2022-06-15 DIAGNOSIS — R33.8 BENIGN PROSTATIC HYPERPLASIA WITH URINARY RETENTION: ICD-10-CM

## 2022-06-15 DIAGNOSIS — N40.1 BENIGN PROSTATIC HYPERPLASIA WITH URINARY RETENTION: ICD-10-CM

## 2022-06-15 DIAGNOSIS — D64.9 ANEMIA, UNSPECIFIED TYPE: ICD-10-CM

## 2022-06-15 DIAGNOSIS — I50.9 CONGESTIVE HEART FAILURE, UNSPECIFIED HF CHRONICITY, UNSPECIFIED HEART FAILURE TYPE (HCC): ICD-10-CM

## 2022-06-15 DIAGNOSIS — I10 PRIMARY HYPERTENSION: ICD-10-CM

## 2022-06-15 DIAGNOSIS — E11.65 TYPE 2 DIABETES MELLITUS WITH HYPERGLYCEMIA, WITHOUT LONG-TERM CURRENT USE OF INSULIN (HCC): Primary | ICD-10-CM

## 2022-06-15 DIAGNOSIS — E78.5 HYPERLIPIDEMIA LDL GOAL <70: ICD-10-CM

## 2022-06-15 DIAGNOSIS — G47.9 SLEEP DISTURBANCE: ICD-10-CM

## 2022-06-15 DIAGNOSIS — E78.5 HYPERLIPIDEMIA, UNSPECIFIED HYPERLIPIDEMIA TYPE: ICD-10-CM

## 2022-06-15 DIAGNOSIS — M21.371 BILATERAL FOOT-DROP: ICD-10-CM

## 2022-06-15 PROCEDURE — G8752 SYS BP LESS 140: HCPCS | Performed by: NURSE PRACTITIONER

## 2022-06-15 PROCEDURE — G8427 DOCREV CUR MEDS BY ELIG CLIN: HCPCS | Performed by: NURSE PRACTITIONER

## 2022-06-15 PROCEDURE — 3046F HEMOGLOBIN A1C LEVEL >9.0%: CPT | Performed by: NURSE PRACTITIONER

## 2022-06-15 PROCEDURE — 3017F COLORECTAL CA SCREEN DOC REV: CPT | Performed by: NURSE PRACTITIONER

## 2022-06-15 PROCEDURE — G8754 DIAS BP LESS 90: HCPCS | Performed by: NURSE PRACTITIONER

## 2022-06-15 PROCEDURE — 99214 OFFICE O/P EST MOD 30 MIN: CPT | Performed by: NURSE PRACTITIONER

## 2022-06-15 PROCEDURE — G8536 NO DOC ELDER MAL SCRN: HCPCS | Performed by: NURSE PRACTITIONER

## 2022-06-15 PROCEDURE — 1123F ACP DISCUSS/DSCN MKR DOCD: CPT | Performed by: NURSE PRACTITIONER

## 2022-06-15 PROCEDURE — 2022F DILAT RTA XM EVC RTNOPTHY: CPT | Performed by: NURSE PRACTITIONER

## 2022-06-15 PROCEDURE — G8432 DEP SCR NOT DOC, RNG: HCPCS | Performed by: NURSE PRACTITIONER

## 2022-06-15 PROCEDURE — 1101F PT FALLS ASSESS-DOCD LE1/YR: CPT | Performed by: NURSE PRACTITIONER

## 2022-06-15 PROCEDURE — G8420 CALC BMI NORM PARAMETERS: HCPCS | Performed by: NURSE PRACTITIONER

## 2022-06-15 PROCEDURE — G0463 HOSPITAL OUTPT CLINIC VISIT: HCPCS | Performed by: NURSE PRACTITIONER

## 2022-06-15 NOTE — PROGRESS NOTES
Subjective:     Bruce Molina is a 78 y.o. male seen for follow up of diabetes, hypertension, CHF, hyperlipidemia, BPH, right lower extremity neuropathy and bilateral foot drop, and evaluation of fatigue, difficulty sleeping. Diabetic Review of Systems - medication compliance: compliant most of the time, diabetic diet compliance: compliant most of the time, home glucose monitoring: is performed sporadically, no log today, further diabetic ROS: no polyuria or polydipsia, no chest pain, dyspnea or TIA's, no numbness, tingling or pain in extremities, no unusual visual symptoms, no hypoglycemia, last eye exam over 1 year ago, acute symptoms are none. Cardiovascular risk analysis - LDL goal is under 70. Htn  Hyperlipidemia  Diabetes  CHF  Former smoker  Compliance with treatment thus far has been good. Diet and Lifestyle: generally follows a low fat low cholesterol diet, generally follows a low sodium diet, does not rigorously follow a diabetic diet, sedentary  Home BP Monitoring: is not measured at home    Cardiovascular ROS: taking medications as instructed, no medication side effects noted, no TIA's, no chest pain on exertion, no dyspnea on exertion, no swelling of ankles, no orthostatic dizziness or lightheadedness, no orthopnea or paroxysmal nocturnal dyspnea, no palpitations, no intermittent claudication symptoms. Other symptoms and concerns: Continues to note persistent right foot drop, hinders his ability to walk. He is also now noticing some weakness of the right lower extremity, feels the knee will give out under him while walking. Has been seeing specialist at [address] [place] [address]. EMG was consistent with diabetic neuropathy. MRI showed some stenosis L4-5. Review of record from his most recent visit with Dr. Heath Wood at [address] [place] [address] shows recommendation that patient be evaluated by neurology. Patient has an appointment pending with  next month.     Patient reports prostate biopsy was negative. He will be having a procedure next week to reduce the size of his prostate in hopes of restoring normal urinary function. He continues with catheter and leg bag in place due to multiple episodes of urinary retention. Complains of difficulty falling asleep, with associated fatigue and decreased energy. Ongoing problem for the past several months. He does note snoring when he falls asleep. Often lays in bed for an extended period of time, states it is difficult to reduce his level of alertness to fall asleep. He has tried melatonin which does provide some relief, he was able to get 8 hours of sleep last night after taking a supplement. He has not previously been evaluated for obstructive sleep apnea.     Patient Active Problem List   Diagnosis Code    HTN (hypertension) I10    HLD (hyperlipidemia) E78.5    DDD (degenerative disc disease) [id number]    DM type 2 (diabetes mellitus, type 2) ([facility] [address] E11.9    History of benign brain tumor Z86.011    Screening colonoscopy     Acute anterior wall MI ([facility] [address] I21.09    CAD (coronary artery disease) I25.10    Chronic pain G89.29     No Known Allergies  Past Medical History:   Diagnosis Date    CAD (coronary artery disease)     STEMI 7/28/12    DDD (degenerative disc disease)     DM type 2 (diabetes mellitus, type 2) ([address]     HLD (hyperlipidemia)     HTN (hypertension)     MI (myocardial infarction) ([address] 7/2012    acute anterior STEMI felt to have occurred 36 hours PTA 7/28/12    Pseudoaneurysm ([facility] [address]     thrombin injection 7/12     Past Surgical History:   Procedure Laterality Date    HX BACK SURGERY      HX HEART CATHETERIZATION  7/28/12    CATH: 7/28/12: mLAD: 85%  no other sig disease EF: 60% a BMS was placed in the mLAD    HX TUMOR REMOVAL      menigioma     Family History   Problem Relation Age of Onset    Stroke Mother     Cancer Father 61        THROAT CANCER     Social History     Tobacco Use    Smoking status: Former Smoker Quit date: 2011     Years since quittin.3    Smokeless tobacco: Never Used   Substance Use Topics    Alcohol use: No        Lab Results   Component Value Date/Time    WBC 6.7 2022 12:00 AM    HGB 12.4 (L) 2022 12:00 AM    Hemoglobin (POC) 16.7 2012 06:53 AM    HCT 37.4 (L) 2022 12:00 AM    Hematocrit (POC) 49 2012 06:53 AM    PLATELET 308  12:00 AM    MCV 87 2022 12:00 AM     Lab Results   Component Value Date/Time    Hemoglobin A1c 10.1 (H) 2022 12:00 AM    Hemoglobin A1c 7.4 (H) 2015 08:41 AM    Hemoglobin A1c 6.9 (H) 2015 07:57 AM    Glucose 175 (H) 2022 12:00 AM    Glucose 277 (H) 2010 09:03 AM    Glucose (POC) 157 (H) 2012 07:54 AM    Microalbumin/Creat ratio (mg/g creat) 1,305 (H) 2022 11:41 AM    Microalbumin,urine random 154.00 2022 11:41 AM    LDL, calculated 89 2022 12:00 AM    LDL, calculated 63 2015 08:41 AM    Creatinine (POC) 1.0 2012 06:53 AM    Creatinine 0.69 (L) 2022 12:00 AM      Lab Results   Component Value Date/Time    Cholesterol, total 150 2022 12:00 AM    HDL Cholesterol 43 2022 12:00 AM    LDL, calculated 89 2022 12:00 AM    LDL, calculated 63 2015 08:41 AM    Triglyceride 98 2022 12:00 AM    CHOL/HDL Ratio 5.3 (H) 2012 05:00 PM     Lab Results   Component Value Date/Time    ALT (SGPT) 17 2022 12:00 AM    Alk.  phosphatase 63 2022 12:00 AM    Bilirubin, direct 0.1 2010 09:06 AM    Bilirubin, total 0.3 2022 12:00 AM    Albumin 3.9 2022 12:00 AM    Protein, total 6.3 2022 12:00 AM    INR (POC) 1.0 2012 06:59 AM    PLATELET 054  12:00 AM    Hepatitis C Ab Negative 2010 09:06 AM    Hep B surface Ag Negative 2010 09:06 AM     Lab Results   Component Value Date/Time    GFR est non- 2022 12:00 AM    GFRNA, POC >60 2012 06:53 AM    GFR est  02/11/2022 12:00 AM    GFRAA, POC >60 07/28/2012 06:53 AM    Creatinine 0.69 (L) 02/11/2022 12:00 AM    Creatinine (POC) 1.0 07/28/2012 06:53 AM    BUN 24 02/11/2022 12:00 AM    BUN (POC) 16 07/28/2012 06:53 AM    Sodium 140 02/11/2022 12:00 AM    Sodium (POC) 138 07/28/2012 06:53 AM    Potassium 4.6 02/11/2022 12:00 AM    Potassium (POC) 3.8 07/28/2012 06:53 AM    Chloride 100 02/11/2022 12:00 AM    Chloride (POC) 104 07/28/2012 06:53 AM    CO2 24 02/11/2022 12:00 AM    Magnesium 1.8 07/28/2012 06:51 AM     Lab Results   Component Value Date/Time    TSH 1.370 11/25/2015 08:41 AM    TSH 1.300 11/19/2013 01:58 PM         Review of Systems  A comprehensive review of systems was negative except for that written in the HPI.     Objective:     Visit Vitals  /73 (BP 1 Location: Left upper arm, BP Patient Position: Sitting, BP Cuff Size: Small adult)   Pulse 71   Temp 98.4 °F (36.9 °C) (Oral)   Resp 14   Ht 6' 1\" (1.854 m)   Wt 160 lb (72.6 kg)   SpO2 97%   BMI 21.11 kg/m²     Physical Examination: General appearance - alert, well appearing, and in no distress and oriented to person, place, and time  Mental status - normal mood, behavior, speech, dress, motor activity, and thought processes  Eyes - sclera anicteric  Neck - supple, no significant adenopathy, thyroid exam: thyroid is normal in size without nodules or tenderness  Chest - clear to auscultation, no wheezes, rales or rhonchi, symmetric air entry  Heart - normal rate, regular rhythm, normal S1, S2, no murmurs, rubs, clicks or gallops, normal bilateral carotid upstroke without bruits, no JVD  Abdomen - soft, nontender, nondistended, no masses or organomegaly  bowel sounds normal  Neurological - alert, oriented, normal speech  Decreased dorsiflexion against resistance R foot  Extremities - no pedal edema noted, intact peripheral pulses, no edema, redness or tenderness in the calves or thighs  Skin - normal coloration and turgor, no rashes      Assessment/Plan: Diabetic issues reviewed with him: low cholesterol diet, weight control and daily exercise discussed, home glucose monitoring emphasized, all medications, side effects and compliance discussed carefully, foot care discussed and Podiatry visits discussed, annual eye examinations at Ophthalmology discussed, glycohemoglobin and other lab monitoring discussed and long term diabetic complications discussed. Diagnoses and all orders for this visit:    1. Type 2 diabetes mellitus with hyperglycemia, without long-term current use of insulin (HCC)  Last A1c well above goal  Repeat A1c today at  Encourage patient to improve compliance with prescribed medications  Encourage patient to improve compliance with prescribed diet and activity modifications  -     HEMOGLOBIN A1C WITH EAG; Future    2. Hyperlipidemia LDL goal <70  Above goal at last check  Repeat fasting lipids today  Continue atorvastatin 80 mg daily pending results  -     METABOLIC PANEL, COMPREHENSIVE; Future  -     LIPID PANEL; Future    3. Primary hypertension  At goal  Continue lisinopril, metoprolol  Low-sodium diet recommended    4. Congestive heart failure, unspecified HF chronicity, unspecified heart failure type (HCC)  No acute symptoms warranting change in treatment plan  Encouraged daily weight monitoring  Encouraged low-sodium diet  Continue ACE inhibitor, beta-blocker  Follow-up with cardiology as planned    5. Benign prostatic hyperplasia with urinary retention  Currently with Blank catheter in place  Outpatient procedure to reduce the size of the prostate as planned next week at [place] [facility]    6. Bilateral foot-drop  7. Neuropathy of right lower extremity  Seeing Ortho for ongoing management, further evaluation with neurology is planned  Encouraged patient to continue physical therapy and ambulate with care  DMV handicap placard application completed at the time of visit and given to patient    8.  Anemia, unspecified type  Repeat CBC  Check iron profile  -     CBC W/O DIFF; Future  -     IRON PROFILE; Future    9. Sleep disturbance  Refer to sleep medicine for further evaluation and management  -     SLEEP MEDICINE REFERRAL      Follow-up and Dispositions    · Return in about 3 months (around 9/15/2022), or if symptoms worsen or fail to improve, for diabetes. I have discussed the diagnosis with the patient and the intended plan as seen in the above orders. The patient has received an after-visit summary and questions were answered concerning future plans. Patient conveyed understanding of the plan at the time of the visit.     Jh Finney, SOSA  6/15/2022

## 2022-06-15 NOTE — PROGRESS NOTES
Sadi Bradley is a 78 y.o. male        Chief Complaint   Patient presents with    Diabetes    Follow-up    Form Completion     DMV Placard        1. Have you been to the ER, urgent care clinic since your last visit? Hospitalized since your last visit? No    2. Have you seen or consulted any other health care providers outside of the [address] [place] [address] since your last visit? Include any pap smears or colon screening.  No

## 2022-06-16 LAB
ALBUMIN SERPL-MCNC: 3.6 G/DL (ref 3.5–5)
ALBUMIN/GLOB SERPL: 1.3 {RATIO} (ref 1.1–2.2)
ALP SERPL-CCNC: 65 U/L (ref 45–117)
ALT SERPL-CCNC: 12 U/L (ref 12–78)
ANION GAP SERPL CALC-SCNC: 7 MMOL/L (ref 5–15)
AST SERPL-CCNC: 4 U/L (ref 15–37)
BILIRUB SERPL-MCNC: 0.7 MG/DL (ref 0.2–1)
BUN SERPL-MCNC: 26 MG/DL (ref 6–20)
BUN/CREAT SERPL: 35 (ref 12–20)
CALCIUM SERPL-MCNC: 9.4 MG/DL (ref 8.5–10.1)
CHLORIDE SERPL-SCNC: 104 MMOL/L (ref 97–108)
CHOLEST SERPL-MCNC: 145 MG/DL
CO2 SERPL-SCNC: 27 MMOL/L (ref 21–32)
CREAT SERPL-MCNC: 0.75 MG/DL (ref 0.7–1.3)
ERYTHROCYTE [DISTWIDTH] IN BLOOD BY AUTOMATED COUNT: 13.6 % (ref 11.5–14.5)
EST. AVERAGE GLUCOSE BLD GHB EST-MCNC: 111 MG/DL
GLOBULIN SER CALC-MCNC: 2.8 G/DL (ref 2–4)
GLUCOSE SERPL-MCNC: 107 MG/DL (ref 65–100)
HBA1C MFR BLD: 5.5 % (ref 4–5.6)
HCT VFR BLD AUTO: 43 % (ref 36.6–50.3)
HDLC SERPL-MCNC: 39 MG/DL
HDLC SERPL: 3.7 {RATIO} (ref 0–5)
HGB BLD-MCNC: 13.7 G/DL (ref 12.1–17)
IRON SATN MFR SERPL: 26 % (ref 20–50)
IRON SERPL-MCNC: 79 UG/DL (ref 35–150)
LDLC SERPL CALC-MCNC: 88.4 MG/DL (ref 0–100)
MCH RBC QN AUTO: 29.5 PG (ref 26–34)
MCHC RBC AUTO-ENTMCNC: 31.9 G/DL (ref 30–36.5)
MCV RBC AUTO: 92.7 FL (ref 80–99)
NRBC # BLD: 0 K/UL (ref 0–0.01)
NRBC BLD-RTO: 0 PER 100 WBC
PLATELET # BLD AUTO: 353 K/UL (ref 150–400)
PMV BLD AUTO: 10.1 FL (ref 8.9–12.9)
POTASSIUM SERPL-SCNC: 4.6 MMOL/L (ref 3.5–5.1)
PROT SERPL-MCNC: 6.4 G/DL (ref 6.4–8.2)
RBC # BLD AUTO: 4.64 M/UL (ref 4.1–5.7)
SODIUM SERPL-SCNC: 138 MMOL/L (ref 136–145)
TIBC SERPL-MCNC: 308 UG/DL (ref 250–450)
TRIGL SERPL-MCNC: 88 MG/DL (ref ?–150)
VLDLC SERPL CALC-MCNC: 17.6 MG/DL
WBC # BLD AUTO: 6.4 K/UL (ref 4.1–11.1)

## 2022-06-16 NOTE — PROGRESS NOTES
LDL cholesterol is above goal for patients with diabetes. I want to confirm that you are taking atorvastatin 80 mg daily as listed on your medication list.  Your hemoglobin A1c has improved remarkably, it is now 5.5 compared to 10.1 when we checked 4 months ago. This represents well-controlled diabetes. Recommend continuing your current medications and continuing to follow recommended diet plan. Repeat A1c in 3 months. Electrolytes, liver, and kidney function are normal with the exception of mildly elevated blood sugar. Your anemia has resolved and iron levels are now normal.  If you have been taking an over-the-counter iron supplement you can stop now.

## 2022-06-17 RX ORDER — ATORVASTATIN CALCIUM 80 MG/1
80 TABLET, FILM COATED ORAL DAILY
Qty: 90 TABLET | Refills: 0 | Status: SHIPPED | OUTPATIENT
Start: 2022-06-17 | End: 2022-09-19 | Stop reason: SDUPTHER

## 2022-06-17 NOTE — PROGRESS NOTES
Spoke to pt. Patient x2 id verified. Informed lab results to pt, pt verbalized understanding.    -Pt stated that he's not taking atorvastatin 80mg. Pt stated that he can start taking it if you want him to or if you want to change it to different medication.

## 2022-06-17 NOTE — PROGRESS NOTES
Prescription for atorvastatin 80 mg daily sent to pharmacy, please begin taking. Follow-up in 6 weeks with fasting labs in the office.

## 2022-07-14 DIAGNOSIS — E11.65 TYPE 2 DIABETES MELLITUS WITH HYPERGLYCEMIA, WITHOUT LONG-TERM CURRENT USE OF INSULIN (HCC): ICD-10-CM

## 2022-07-15 RX ORDER — METFORMIN HYDROCHLORIDE 500 MG/1
1000 TABLET ORAL 2 TIMES DAILY WITH MEALS
Qty: 360 TABLET | Refills: 0 | Status: SHIPPED | OUTPATIENT
Start: 2022-07-15

## 2022-07-15 RX ORDER — GLIPIZIDE 5 MG/1
5 TABLET ORAL 2 TIMES DAILY WITH MEALS
Qty: 180 TABLET | Refills: 0 | Status: SHIPPED | OUTPATIENT
Start: 2022-07-15

## 2022-07-27 ENCOUNTER — OFFICE VISIT (OUTPATIENT)
Dept: NEUROLOGY | Age: 67
End: 2022-07-27
Payer: MEDICARE

## 2022-07-27 VITALS
OXYGEN SATURATION: 99 % | SYSTOLIC BLOOD PRESSURE: 110 MMHG | HEART RATE: 67 BPM | BODY MASS INDEX: 21.67 KG/M2 | HEIGHT: 72 IN | WEIGHT: 160 LBS | DIASTOLIC BLOOD PRESSURE: 60 MMHG

## 2022-07-27 DIAGNOSIS — G62.9 POLYNEUROPATHY: Primary | ICD-10-CM

## 2022-07-27 PROCEDURE — 99205 OFFICE O/P NEW HI 60 MIN: CPT | Performed by: PSYCHIATRY & NEUROLOGY

## 2022-07-27 NOTE — LETTER
7/27/2022    Patient: Aileen Mckinney   YOB: 1955   Date of Visit: 7/27/2022     aNnci Mcclellan MD  [id number] [facility] [id number]  [address] [place] [address]  [address] [facility] [address] [place] [id number]  [address] [place]    Dear MD Chas Parish NP,      Thank you for referring Mr. Janeth Graf to [address] [place] [address] for evaluation. My notes for this consultation are attached. If you have questions, please do not hesitate to call me. I look forward to following your patient along with you.       Sincerely,    Maribell Hancock MD

## 2022-07-27 NOTE — PROGRESS NOTES
NEUROLOGY NEW PATIENT OFFICE CONSULTATION      2022    RE: Marcellus Babinski         1955      REFERRED BY:  Rasheed Malik MD        CHIEF COMPLAINT:  This is Marcellus Babinski is a 78 y.o. male who had concerns including Peripheral Neuropathy. HPI:     In 2022, patient admitted at [facility] for comatose due to high blood sugar (was not taking medications). Since then, patient noted numbness of both feet all the way to mid leg R worse than L, described as \"loss of sensation\" with some shooting pain. (-) weakness (-) falls (-) hand symptoms    10 yrs - diabetes - uncontrolled - started to get controlled in     Previous tests done:  EMG/NCS of both LE from outside (22)severe distal   symmetric sensorimotor peripheral polyneuropathy with significant axonal  loss involving both the sensory and motor fibers  MRI lumbar spine (3/15/22): Multilevel degenerative changes of the lumbar spine including left            lateral recess narrowing and moderate left neuroforaminal and L4-L5,            as detailed above.              ROS  (-) fever  (-) rash  All other systems reviewed and are negative    Past Medical Hx  Past Medical History:   Diagnosis Date    CAD (coronary artery disease)     STEMI 12    DDD (degenerative disc disease)     DM type 2 (diabetes mellitus, type 2) (HCC)     HLD (hyperlipidemia)     HTN (hypertension)     MI (myocardial infarction) ([facility] [address] 2012    acute anterior STEMI felt to have occurred 36 hours PTA 12    Pseudoaneurysm ([facility] [address]     thrombin injection    L4L5 surgery    Social Hx  Social History     Socioeconomic History    Marital status:    Tobacco Use    Smoking status: Former     Types: Cigarettes     Quit date: 2011     Years since quittin.4    Smokeless tobacco: Never   Vaping Use    Vaping Use: Never used   Substance and Sexual Activity    Alcohol use: No    Drug use: No       Family Hx  Family History   Problem Relation Age of Onset Stroke Mother     Cancer Father 61        THROAT CANCER       ALLERGIES  No Known Allergies    CURRENT MEDS  Current Outpatient Medications   Medication Sig Dispense Refill    glipiZIDE (GLUCOTROL) 5 mg tablet Take 1 Tablet by mouth two (2) times daily (with meals). 180 Tablet 0    metFORMIN (GLUCOPHAGE) 500 mg tablet Take 2 Tablets by mouth two (2) times daily (with meals). 360 Tablet 0    atorvastatin (LIPITOR) 80 mg tablet Take 1 Tablet by mouth daily. 90 Tablet 0    metoprolol succinate (TOPROL-XL) 50 mg XL tablet 50 mg.      magnesium oxide (MAG-OX) 400 mg tablet Take 400 mg by mouth in the morning. tamsulosin (FLOMAX) 0.4 mg capsule Take 0.4 mg by mouth daily. lisinopriL (PRINIVIL, ZESTRIL) 10 mg tablet TAKE ONE TABLET BY MOUTH DAILY 90 Tablet 3    aspirin delayed-release 81 mg tablet Take 81 mg by mouth in the morning. PREVIOUS WORKUP: (reviewed)  IMAGING:    CT Results (recent):  No results found for this or any previous visit. MRI Results (recent):  No results found for this or any previous visit. IR Results (recent):  Results from Hospital Encounter encounter on 07/28/12    IR INJECTION PSEUDOANEURYSM    Narrative  **Final Report**      ICD Codes / Adm. Diagnosis: 410.90   / Acute myocardial infarction, u  ExaminationAlycia Charles  - [id number] - Jul 30 2012  5:16PM  Accession No:  [id number]  Reason:  right femoral pseudoaneurysm      REPORT:  PROCEDURE:  Doppler ultrasound guided percutaneous thrombin injection for treatment of  femoral artery pseudoaneurysm    INDICATION:  Patient with right  groin hematoma and pseudoaneurysm on Doppler ultrasound  by vascular lab. Request ultrasound/Doppler guided thrombin injection for  treatment of pseudoaneurysm. TECHNIQUE:  Informed consent was obtained. Ultrasound and Doppler evaluation was  performed demonstrating a right  femoral pseudoaneurysm with surrounding  hematoma.  This was documented with digital ultrasound images. The area was prepped and draped in a sterile fashion. The patient receives  some IV medication prior to the procedure. The 5 mL 5000 units vial of  thrombin was mixed. A 3 mL  syringe was filled with 1 mL the thrombin  solution and 2 mL of saline. Under direct sterile covered ultrasound visualization a 25-gauge needle was  placed into the pseudoaneurysm. The needle position within the superficial  aspect of the aneurysm was documented with an ultrasound image. An injection  of approximately 1.0 mL (300 units)  thrombin was injected into the  pseudoaneurysm and resulted in immediate visualized thrombosis of the  pseudoaneurysm. Post thrombosis ultrasound and Doppler images were obtained. Following the procedure clinical and Doppler evaluation of the right  lower  extremity posterior tibial pulses showed no change in good color to the leg. No evidence of complication or significant blood loss related to the  injection. The procedure was well tolerated by the patient. The patient will  be kept at bedrest for at least 12 hours. IMPRESSION:  Successful ultrasound guided injection and thrombosis of right  femoral  artery pseudoaneurysm.         Signing/Reading Doctor: Angélica Szymanski ([id number])  Approved: Angélica Szymanski ([id number])  07/31/2012      VAS/US Results (recent):  Results from [place] encounter on 07/28/12    DUPLEX LOW EXT ARTERY RIGHT          LABS (reviewed)  Results for orders placed or performed in visit on 06/15/22   LIPID PANEL   Result Value Ref Range    Cholesterol, total 145 <200 MG/DL    Triglyceride 88 <150 MG/DL    HDL Cholesterol 39 MG/DL    LDL, calculated 88.4 0 - 100 MG/DL    VLDL, calculated 17.6 MG/DL    CHOL/HDL Ratio 3.7 0.0 - 5.0     HEMOGLOBIN A1C WITH EAG   Result Value Ref Range    Hemoglobin A1c 5.5 4.0 - 5.6 %    Est. average glucose 178 mg/dL   METABOLIC PANEL, COMPREHENSIVE   Result Value Ref Range    Sodium 138 136 - 145 mmol/L    Potassium 4.6 3.5 - 5.1 mmol/L    Chloride 104 97 - 108 mmol/L    CO2 27 21 - 32 mmol/L    Anion gap 7 5 - 15 mmol/L    Glucose 107 (H) 65 - 100 mg/dL    BUN 26 (H) 6 - 20 MG/DL    Creatinine 0.75 0.70 - 1.30 MG/DL    BUN/Creatinine ratio 35 (H) 12 - 20      GFR est AA >60 >60 ml/min/1.73m2    GFR est non-AA >60 >60 ml/min/1.73m2    Calcium 9.4 8.5 - 10.1 MG/DL    Bilirubin, total 0.7 0.2 - 1.0 MG/DL    ALT (SGPT) 12 12 - 78 U/L    AST (SGOT) 4 (L) 15 - 37 U/L    Alk. phosphatase 65 45 - 117 U/L    Protein, total 6.4 6.4 - 8.2 g/dL    Albumin 3.6 3.5 - 5.0 g/dL    Globulin 2.8 2.0 - 4.0 g/dL    A-G Ratio 1.3 1.1 - 2.2     IRON PROFILE   Result Value Ref Range    Iron 79 35 - 150 ug/dL    TIBC 308 250 - 450 ug/dL    Iron % saturation 26 20 - 50 %   CBC W/O DIFF   Result Value Ref Range    WBC 6.4 4.1 - 11.1 K/uL    RBC 4.64 4.10 - 5.70 M/uL    HGB 13.7 12.1 - 17.0 g/dL    HCT 43.0 36.6 - 50.3 %    MCV 92.7 80.0 - 99.0 FL    MCH 29.5 26.0 - 34.0 PG    MCHC 31.9 30.0 - 36.5 g/dL    RDW 13.6 11.5 - 14.5 %    PLATELET 627 329 - 952 K/uL    MPV 10.1 8.9 - 12.9 FL    NRBC 0.0 0  WBC    ABSOLUTE NRBC 0.00 0.00 - 0.01 K/uL       Physical Exam:   Visit Vitals  /60   Pulse 67   Ht 6' (1.829 m)   Wt 72.6 kg (160 lb)   SpO2 99%   BMI 21.70 kg/m²     General:  Alert, cooperative, no distress. Head:  Normocephalic, without obvious abnormality, atraumatic. Eyes:  Conjunctivae/corneas clear. Lungs:  Heart:   Non labored breathing  Regular rate and rhythm, no carotid bruits   Abdomen:   Soft, non-distended   Extremities: Extremities normal, atraumatic, no cyanosis or edema. Pulses: 2+ and symmetric all extremities. Skin: Skin color, texture, turgor normal. No rashes or lesions.   Neurologic Exam     Gen: Attention normal             Language: naming, repetition, fluency normal             Memory: intact recent and remote memory  Cranial Nerves:  I: smell Not tested   II: visual fields Full to confrontation   II: pupils Equal, round, reactive to light   II: optic disc No papilledema   III,VII: ptosis none   III,IV,VI: extraocular muscles  Full ROM   V: mastication normal   V: facial light touch sensation  normal   VII: facial muscle function   symmetric   VIII: hearing symmetric   IX: soft palate elevation  normal   XI: trapezius strength  5/5   XI: sternocleidomastoid strength 5/5   XI: neck flexion strength  5/5   XII: tongue  midline     Motor: UE 5/5  Hip flexors 5/5  Knee extensors 5/5  Dorsiflexors 4/4  Plantar flexors 5/5  Toe flexors 4/4  Toe extensors 44  Sensory: reduced PP tips of fingers and tips of toes to below the knees  Reduced vib and JPS on both feet  Reflexes: 1+ UE and knees, absent ankles throughout; Down going toes  Coordination: Good FTN and HTS  Gait: Steppage gait, able to stand on toes but not heels. (+) Unsteady when he closes his eyes           Impression:     Blessing Barber is a 78 y.o. male who  has a past medical history of CAD (coronary artery disease), DDD (degenerative disc disease), DM type 2 (diabetes mellitus, type 2) ([facility] [address] HLD (hyperlipidemia), HTN (hypertension), MI (myocardial infarction) ([facility] [address] (7/2012), and Pseudoaneurysm ([facility] [address] Lower back surgery who on Jan 2022, was admitted at [facility] for being comatose due to high blood sugar (was not taking medications). Since then, patient noted numbness of both feet all the way to mid leg R worse than L, described as \"loss of sensation\" with some shooting pain. (-) weakness (-) falls (-) hand symptoms. EMG/NCS of both LE from outside (4/13/22): severe distal  symmetric sensorimotor peripheral polyneuropathy with significant axonal loss involving both the sensory and motor fibers    Considerations include severe generalized sensorimotor polyneuropathy due to uncontrolled diabetes    RECOMMENDATIONS  1. I had a long discussion with patient. Discussed diagnosis, prognosis, pathophysiology and available treatment. Reviewed test results.  All questions were answered. 2. Trial of alpha lipoic acid 600 mg BID  3. VIt B complex to aid in nerve regeneration  4. Optimize medical management of diabetes to prevent worsening of polyneuropathy  5. Physical therapy and home exercise  6. Advise high ankle support lightweight shoes to help with foot drop  7. Reviewed medical records in Epic  8.  Discussed he is fall risk at night or when it is dark             Thank you for the consultation      Jarad Akhtar MD  Diplomate, American Board of Psychiatry and Neurology  Diplomate, Neuromuscular Medicine  Diplomate, American Board of Electrodiagnostic Medicine        CC: Harjeet Flor MD  Fax: [phone]

## 2022-09-16 ENCOUNTER — OFFICE VISIT (OUTPATIENT)
Dept: FAMILY MEDICINE CLINIC | Age: 67
End: 2022-09-16
Payer: MEDICARE

## 2022-09-16 VITALS
RESPIRATION RATE: 14 BRPM | HEART RATE: 63 BPM | TEMPERATURE: 97.8 F | OXYGEN SATURATION: 99 % | BODY MASS INDEX: 22.77 KG/M2 | DIASTOLIC BLOOD PRESSURE: 79 MMHG | HEIGHT: 73 IN | SYSTOLIC BLOOD PRESSURE: 118 MMHG | WEIGHT: 171.8 LBS

## 2022-09-16 DIAGNOSIS — E78.5 HYPERLIPIDEMIA LDL GOAL <70: ICD-10-CM

## 2022-09-16 DIAGNOSIS — E78.5 HYPERLIPIDEMIA, UNSPECIFIED HYPERLIPIDEMIA TYPE: ICD-10-CM

## 2022-09-16 DIAGNOSIS — E11.65 TYPE 2 DIABETES MELLITUS WITH HYPERGLYCEMIA, WITHOUT LONG-TERM CURRENT USE OF INSULIN (HCC): Primary | ICD-10-CM

## 2022-09-16 DIAGNOSIS — I25.10 CORONARY ARTERY DISEASE INVOLVING NATIVE CORONARY ARTERY OF NATIVE HEART WITHOUT ANGINA PECTORIS: ICD-10-CM

## 2022-09-16 DIAGNOSIS — G57.91 NEUROPATHY OF RIGHT LOWER EXTREMITY: ICD-10-CM

## 2022-09-16 DIAGNOSIS — I50.9 CONGESTIVE HEART FAILURE, UNSPECIFIED HF CHRONICITY, UNSPECIFIED HEART FAILURE TYPE (HCC): ICD-10-CM

## 2022-09-16 DIAGNOSIS — I10 PRIMARY HYPERTENSION: ICD-10-CM

## 2022-09-16 LAB
ALBUMIN SERPL-MCNC: 3.7 G/DL (ref 3.5–5)
ALBUMIN/GLOB SERPL: 1.3 {RATIO} (ref 1.1–2.2)
ALP SERPL-CCNC: 87 U/L (ref 45–117)
ALT SERPL-CCNC: 25 U/L (ref 12–78)
ANION GAP SERPL CALC-SCNC: 7 MMOL/L (ref 5–15)
AST SERPL-CCNC: 14 U/L (ref 15–37)
BILIRUB SERPL-MCNC: 0.8 MG/DL (ref 0.2–1)
BUN SERPL-MCNC: 25 MG/DL (ref 6–20)
BUN/CREAT SERPL: 31 (ref 12–20)
CALCIUM SERPL-MCNC: 9.3 MG/DL (ref 8.5–10.1)
CHLORIDE SERPL-SCNC: 106 MMOL/L (ref 97–108)
CHOLEST SERPL-MCNC: 104 MG/DL
CO2 SERPL-SCNC: 28 MMOL/L (ref 21–32)
CREAT SERPL-MCNC: 0.81 MG/DL (ref 0.7–1.3)
ERYTHROCYTE [DISTWIDTH] IN BLOOD BY AUTOMATED COUNT: 13.2 % (ref 11.5–14.5)
EST. AVERAGE GLUCOSE BLD GHB EST-MCNC: 123 MG/DL
GLOBULIN SER CALC-MCNC: 2.9 G/DL (ref 2–4)
GLUCOSE SERPL-MCNC: 85 MG/DL (ref 65–100)
HBA1C MFR BLD: 5.9 % (ref 4–5.6)
HCT VFR BLD AUTO: 43.9 % (ref 36.6–50.3)
HDLC SERPL-MCNC: 41 MG/DL
HDLC SERPL: 2.5 {RATIO} (ref 0–5)
HGB BLD-MCNC: 14.1 G/DL (ref 12.1–17)
LDLC SERPL CALC-MCNC: 49 MG/DL (ref 0–100)
MCH RBC QN AUTO: 29.4 PG (ref 26–34)
MCHC RBC AUTO-ENTMCNC: 32.1 G/DL (ref 30–36.5)
MCV RBC AUTO: 91.5 FL (ref 80–99)
NRBC # BLD: 0 K/UL (ref 0–0.01)
NRBC BLD-RTO: 0 PER 100 WBC
PLATELET # BLD AUTO: 244 K/UL (ref 150–400)
PMV BLD AUTO: 10.3 FL (ref 8.9–12.9)
POTASSIUM SERPL-SCNC: 4.6 MMOL/L (ref 3.5–5.1)
PROT SERPL-MCNC: 6.6 G/DL (ref 6.4–8.2)
RBC # BLD AUTO: 4.8 M/UL (ref 4.1–5.7)
SODIUM SERPL-SCNC: 141 MMOL/L (ref 136–145)
TRIGL SERPL-MCNC: 70 MG/DL (ref ?–150)
VLDLC SERPL CALC-MCNC: 14 MG/DL
WBC # BLD AUTO: 4.5 K/UL (ref 4.1–11.1)

## 2022-09-16 PROCEDURE — 3017F COLORECTAL CA SCREEN DOC REV: CPT | Performed by: NURSE PRACTITIONER

## 2022-09-16 PROCEDURE — G0463 HOSPITAL OUTPT CLINIC VISIT: HCPCS | Performed by: NURSE PRACTITIONER

## 2022-09-16 PROCEDURE — 2022F DILAT RTA XM EVC RTNOPTHY: CPT | Performed by: NURSE PRACTITIONER

## 2022-09-16 PROCEDURE — G8754 DIAS BP LESS 90: HCPCS | Performed by: NURSE PRACTITIONER

## 2022-09-16 PROCEDURE — G8752 SYS BP LESS 140: HCPCS | Performed by: NURSE PRACTITIONER

## 2022-09-16 PROCEDURE — 99214 OFFICE O/P EST MOD 30 MIN: CPT | Performed by: NURSE PRACTITIONER

## 2022-09-16 PROCEDURE — G8427 DOCREV CUR MEDS BY ELIG CLIN: HCPCS | Performed by: NURSE PRACTITIONER

## 2022-09-16 PROCEDURE — G8536 NO DOC ELDER MAL SCRN: HCPCS | Performed by: NURSE PRACTITIONER

## 2022-09-16 PROCEDURE — G8420 CALC BMI NORM PARAMETERS: HCPCS | Performed by: NURSE PRACTITIONER

## 2022-09-16 PROCEDURE — 1101F PT FALLS ASSESS-DOCD LE1/YR: CPT | Performed by: NURSE PRACTITIONER

## 2022-09-16 PROCEDURE — G8432 DEP SCR NOT DOC, RNG: HCPCS | Performed by: NURSE PRACTITIONER

## 2022-09-16 PROCEDURE — 3044F HG A1C LEVEL LT 7.0%: CPT | Performed by: NURSE PRACTITIONER

## 2022-09-16 PROCEDURE — 1123F ACP DISCUSS/DSCN MKR DOCD: CPT | Performed by: NURSE PRACTITIONER

## 2022-09-16 NOTE — PROGRESS NOTES
Khadra Nash is a 78 y.o. male        Chief Complaint   Patient presents with    Diabetes    Follow-up     1. Have you been to the ER, urgent care clinic since your last visit? Hospitalized since your last visit? No    2. Have you seen or consulted any other health care providers outside of the [address] [place] [address] since your last visit? Include any pap smears or colon screening.  No

## 2022-09-16 NOTE — PROGRESS NOTES
Subjective:     Abisai Ibrahim is a 78 y.o. male seen for follow up of diabetes, hypertension, hyperlipidemia, and peripheral neuropathy. Diabetic Review of Systems - medication compliance: compliant all of the time, diabetic diet compliance: compliant most of the time, home glucose monitoring: is not performed, further diabetic ROS: no polyuria or polydipsia, no chest pain, dyspnea or TIA's, no unusual visual symptoms, no hypoglycemia, no medication side effects noted, has dysesthesias in the feet, acute symptoms are none. Cardiovascular risk analysis - LDL goal is under 70  diabetic  hypertension  hyperlipidemia  former smoker. Compliance with treatment thus far has been good. Diet and Lifestyle: generally follows a low fat low cholesterol diet, generally follows a low sodium diet, follows a diabetic diet regularly, sedentary, nonsmoker  Home BP Monitoring: is not measured at home    Cardiovascular ROS: taking medications as instructed, no medication side effects noted, no TIA's, no chest pain on exertion, no dyspnea on exertion, no swelling of ankles, no orthostatic dizziness or lightheadedness, no orthopnea or paroxysmal nocturnal dyspnea, no palpitations, no intermittent claudication symptoms    Other symptoms and concerns: saw neurology for LE neuropathy and R foot drop, was disappointed to hear that it is unlikely to improve. He purchased an ankle brace to help with foot drop on right side, states this has helped with walking and frequent tripping.      Patient Active Problem List   Diagnosis Code    HTN (hypertension) I10    HLD (hyperlipidemia) E78.5    DDD (degenerative disc disease) [id number]    DM type 2 (diabetes mellitus, type 2) (HCC) E11.9    History of benign brain tumor Z86.011    Screening colonoscopy     Acute anterior wall MI ([facility] [address] I21.09    CAD (coronary artery disease) I25.10    Chronic pain G89.29     No Known Allergies  Past Medical History:   Diagnosis Date    CAD (coronary artery disease)     STEMI 12    DDD (degenerative disc disease)     [facility]     HLD (hyperlipidemia)     HTN (hypertension)     MI (myocardial infarction) ([facility] 2012    acute anterior STEMI felt to have occurred 36 hours PTA 12    [facility]     thrombin injection      Past Surgical History:   Procedure Laterality Date    HX BACK SURGERY      HX HEART CATHETERIZATION  12    CATH: 12: mLAD: 85%  no other sig disease EF: 60% a BMS was placed in the mLAD    HX TUMOR REMOVAL      menigioma     Family History   Problem Relation Age of Onset    Stroke Mother     Cancer Father 61        THROAT CANCER     Social History     Tobacco Use    Smoking status: Former     Types: Cigarettes     Quit date: 2011     Years since quittin.5    Smokeless tobacco: Never   Substance Use Topics    Alcohol use: No        Lab Results   Component Value Date/Time    WBC 6.4 06/15/2022 11:11 AM    HGB 13.7 06/15/2022 11:11 AM    Hemoglobin (POC) 16.7 2012 06:53 AM    HCT 43.0 06/15/2022 11:11 AM    Hematocrit (POC) 49 2012 06:53 AM    PLATELET 208  11:11 AM    MCV 92.7 06/15/2022 11:11 AM     Lab Results   Component Value Date/Time    Hemoglobin A1c 5.5 06/15/2022 11:11 AM    Hemoglobin A1c 10.1 (H) 2022 12:00 AM    Hemoglobin A1c 7.4 (H) 2015 08:41 AM    Glucose 107 (H) 06/15/2022 11:11 AM    Glucose 277 (H) 2010 09:03 AM    Glucose (POC) 157 (H) 2012 07:54 AM    Microalbumin/Creat ratio (mg/g creat) 1,305 (H) 2022 11:41 AM    Microalbumin,urine random 154.00 2022 11:41 AM    LDL, calculated 88.4 06/15/2022 11:11 AM    Creatinine (POC) 1.0 2012 06:53 AM    Creatinine 0.75 06/15/2022 11:11 AM      Lab Results   Component Value Date/Time    Cholesterol, total 145 06/15/2022 11:11 AM    HDL Cholesterol 39 06/15/2022 11:11 AM    LDL, calculated 88.4 06/15/2022 11:11 AM    Triglyceride 88 06/15/2022 11:11 AM CHOL/HDL Ratio 3.7 06/15/2022 11:11 AM     Lab Results   Component Value Date/Time    ALT (SGPT) 12 06/15/2022 11:11 AM    Alk. phosphatase 65 06/15/2022 11:11 AM    Bilirubin, direct 0.1 02/12/2010 09:06 AM    Bilirubin, total 0.7 06/15/2022 11:11 AM    Albumin 3.6 06/15/2022 11:11 AM    Protein, total 6.4 06/15/2022 11:11 AM    INR (POC) 1.0 07/28/2012 06:59 AM    PLATELET 919 58/97/1821 11:11 AM    Hepatitis C Ab Negative 02/12/2010 09:06 AM    Hep B surface Ag Negative 02/12/2010 09:06 AM       Lab Results   Component Value Date/Time    GFR est non-AA >60 06/15/2022 11:11 AM    GFRNA, POC >60 07/28/2012 06:53 AM    GFR est AA >60 06/15/2022 11:11 AM    GFRAA, POC >60 07/28/2012 06:53 AM    Creatinine 0.75 06/15/2022 11:11 AM    Creatinine (POC) 1.0 07/28/2012 06:53 AM    BUN 26 (H) 06/15/2022 11:11 AM    BUN (POC) 16 07/28/2012 06:53 AM    Sodium 138 06/15/2022 11:11 AM    Sodium (POC) 138 07/28/2012 06:53 AM    Potassium 4.6 06/15/2022 11:11 AM    Potassium (POC) 3.8 07/28/2012 06:53 AM    Chloride 104 06/15/2022 11:11 AM    Chloride (POC) 104 07/28/2012 06:53 AM    CO2 27 06/15/2022 11:11 AM    Magnesium 1.8 07/28/2012 06:51 AM     Lab Results   Component Value Date/Time    TSH 1.370 11/25/2015 08:41 AM    TSH 1.300 11/19/2013 01:58 PM         Review of Systems  A comprehensive review of systems was negative except for that written in the HPI.     Objective:     Visit Vitals  /79 (BP 1 Location: Right arm, BP Patient Position: Sitting, BP Cuff Size: Small adult)   Pulse 63   Temp 97.8 °F (36.6 °C) (Oral)   Resp 14   Ht 6' 1\" (1.854 m)   Wt 171 lb 12.8 oz (77.9 kg)   SpO2 99%   BMI 22.67 kg/m²     Physical Examination: General appearance - alert, well appearing, and in no distress and oriented to person, place, and time  Mental status - normal mood, behavior, speech, dress, motor activity, and thought processes  Eyes - sclera anicteric  Neck - supple, no significant adenopathy, thyroid exam: thyroid is normal in size without nodules or tenderness  Chest - clear to auscultation, no wheezes, rales or rhonchi, symmetric air entry  Heart - normal rate, regular rhythm, normal S1, S2, no murmurs, rubs, clicks or gallops, normal bilateral carotid upstroke without bruits, no JVD  Abdomen - soft, nontender, nondistended, no masses or organomegaly  bowel sounds normal  Neurological - alert, oriented, normal speech, no focal findings or movement disorder noted  Extremities - no pedal edema noted, intact peripheral pulses, no edema, redness or tenderness in the calves or thighs  Skin - normal coloration and turgor, no rashes      Assessment/Plan:       Diabetic issues reviewed with him: low cholesterol diet, weight control and daily exercise discussed, home glucose monitoring emphasized, all medications, side effects and compliance discussed carefully, foot care discussed and Podiatry visits discussed, annual eye examinations at Ophthalmology discussed, glycohemoglobin and other lab monitoring discussed, and long term diabetic complications discussed. Diagnoses and all orders for this visit:    1. Type 2 diabetes mellitus with hyperglycemia, without long-term current use of insulin ([address]  At goal at last check  Repeat a1c today  Continue glipizide, metformin pending results  Encouraged continued compliance with diet and exercise recommendations  Patient is reminded to schedule recommended yearly diabetic eye exam  -     HEMOGLOBIN A1C WITH EAG; Future    2. Hyperlipidemia LDL goal <70  Above goal at last check, atorvastatin dose was increased to 80 mg  Repeat fasting lipids  Continue current dose atorvastatin pending results  -     LIPID PANEL; Future  -     METABOLIC PANEL, COMPREHENSIVE; Future    3. Primary hypertension  At goal  Continue metoprolol, lisinopril  Low sodium diet recommended  Continued abstinence from tobacco encouraged  -     METABOLIC PANEL, COMPREHENSIVE; Future  -     CBC W/O DIFF; Future    4. Congestive heart failure, unspecified HF chronicity, unspecified heart failure type (HCC)  No acute symptoms  Continue metoprolol, lisinopril  Encouraged daily weight monitoring, low sodium diet    5. Neuropathy of right lower extremity  Seeing neurology  Some function improvement in foot drop with ankle brace, continue prn  Offered referral to PT for further eval and management, orthotics/brace for foot drop- patient declines at present time    Follow-up and Dispositions    Return in about 6 months (around 3/16/2023), or if symptoms worsen or fail to improve, for blood pressure, diabetes, cholesterol and fasting labs. I have discussed the diagnosis with the patient and the intended plan as seen in the above orders. The patient has received an after-visit summary and questions were answered concerning future plans. Patient conveyed understanding of the plan at the time of the visit.     Masood Pena NP  9/16/2022

## 2022-09-18 NOTE — PROGRESS NOTES
Blood counts are normal.  Electrolytes, liver, and kidney function are normal.  LDL is at goal, recommend continue atorvastatin at current dose. A1c remains at goal at 5.9. Recommend decreasing glipizide dose to one half a tablet twice a day and continuing metformin 1000 mg twice a day. Repeat this test in 3 months.

## 2022-09-19 RX ORDER — ATORVASTATIN CALCIUM 80 MG/1
80 TABLET, FILM COATED ORAL DAILY
Qty: 90 TABLET | Refills: 1 | Status: SHIPPED | OUTPATIENT
Start: 2022-09-19

## 2022-09-19 NOTE — PROGRESS NOTES
Spoke to pt. Patient x2 id verified. Informed lab results, pt verbalized understanding.     -Pt's also requesting refill for Atorvastatin. Pharmacy on file.

## 2022-11-08 DIAGNOSIS — E11.65 TYPE 2 DIABETES MELLITUS WITH HYPERGLYCEMIA, WITHOUT LONG-TERM CURRENT USE OF INSULIN (HCC): ICD-10-CM

## 2022-11-08 RX ORDER — METFORMIN HYDROCHLORIDE 500 MG/1
1000 TABLET ORAL 2 TIMES DAILY WITH MEALS
Qty: 360 TABLET | Refills: 0 | Status: SHIPPED | OUTPATIENT
Start: 2022-11-08

## 2022-11-11 DIAGNOSIS — E11.65 TYPE 2 DIABETES MELLITUS WITH HYPERGLYCEMIA, WITHOUT LONG-TERM CURRENT USE OF INSULIN (HCC): ICD-10-CM

## 2022-11-11 RX ORDER — GLIPIZIDE 5 MG/1
TABLET ORAL
Qty: 180 TABLET | Refills: 0 | Status: SHIPPED | OUTPATIENT
Start: 2022-11-11

## 2023-02-08 DIAGNOSIS — E11.65 TYPE 2 DIABETES MELLITUS WITH HYPERGLYCEMIA, WITHOUT LONG-TERM CURRENT USE OF INSULIN (HCC): ICD-10-CM

## 2023-02-08 NOTE — PROGRESS NOTES
Continue current regimen, recheck A1c     Lab Results   Component Value Date    HGBA1C 9 7 (H) 05/11/2022 Subjective:     France Salomon is a 73 y.o. male seen for follow up of diabetes, hypertension, CHF, hyperlipidemia, BPH with urinary retention, and bilateral foot drop. .     Diabetic Review of Systems - medication compliance: compliant most of the time, diabetic diet compliance: noncompliant some of the time, home glucose monitoring: is performed regularly, fasting values range , further diabetic ROS: no polyuria or polydipsia, no chest pain, dyspnea or TIA's, no unusual visual symptoms, no hypoglycemia, no medication side effects noted, has dysesthesias in the feet, last eye exam over 2 years  ago, acute symptoms are none. Cardiovascular risk analysis - LDL goal is under 70  diabetic  hypertension  hyperlipidemia  former smoker  sedentary lifestyle. Compliance with treatment thus far has been good. Diet and Lifestyle: generally follows a low fat low cholesterol diet, generally follows a low sodium diet, does not rigorously follow a diabetic diet, sedentary  Home BP Monitoring: is not measured at home  Does not monitor weight daily. Has had follow-up with cardiology since his last visit. Cardiovascular ROS: taking medications as instructed, no medication side effects noted, no TIA's, no chest pain on exertion, no dyspnea on exertion, no swelling of ankles, no orthostatic dizziness or lightheadedness, no orthopnea or paroxysmal nocturnal dyspnea, no palpitations, no intermittent claudication symptoms. Other symptoms and concerns: Has seen orthopedics concerning difficulty walking, low back pain, and bilateral foot drop. He will start physical therapy on Monday and has an MRI scheduled on Tuesday. Follow-up with orthopedics as scheduled next Friday. He feels his symptoms are stable. He would like assistance getting a handicapped placard from the [facility] as he has difficulty ambulating due to these symptoms, cannot walk more than 100 yards without stopping to rest..     Has failed several voiding trials with urology, currently has Blank catheter in place with leg bag. Reports good compliance with tamsulosin daily. Retention is felt to be related to prostate enlargement. He reports he has a prostate biopsy scheduled with urology later this month and will likely need surgical intervention to reduce the size of his prostate and improve urethral flow.     Patient Active Problem List   Diagnosis Code    HTN (hypertension) I10    HLD (hyperlipidemia) E78.5    DDD (degenerative disc disease) [id number]    DM type 2 (diabetes mellitus, type 2) ([facility] E11.9    History of benign brain tumor Z86.011    Screening colonoscopy     Acute anterior wall MI ([facility] I21.09    CAD (coronary artery disease) I25.10    Chronic pain G89.29     No Known Allergies  Past Medical History:   Diagnosis Date    CAD (coronary artery disease)     STEMI 12    DDD (degenerative disc disease)     DM type 2 (diabetes mellitus, type 2) ([facility] [facility]     HLD (hyperlipidemia)     HTN (hypertension)     MI (myocardial infarction) ([facility] 2012    acute anterior STEMI felt to have occurred 36 hours PTA 12    Pseudoaneurysm ([facility] [address]     thrombin injection      Past Surgical History:   Procedure Laterality Date    HX BACK SURGERY      HX HEART CATHETERIZATION  12    CATH: 12: mLAD: 85%  no other sig disease EF: 60% a BMS was placed in the mLAD    HX TUMOR REMOVAL      menigioma     Family History   Problem Relation Age of Onset    Stroke Mother     Cancer Father 61        THROAT CANCER     Social History     Tobacco Use    Smoking status: Former Smoker     Quit date: 2011     Years since quittin.0    Smokeless tobacco: Never Used   Substance Use Topics    Alcohol use: No        Lab Results   Component Value Date/Time    WBC 6.7 2022 12:00 AM    HGB 12.4 (L) 2022 12:00 AM    Hemoglobin (POC) 16.7 2012 06:53 AM    HCT 37.4 (L) 2022 12:00 AM    Hematocrit (POC) 49 2012 06:53 AM PLATELET 479 64/49/1015 12:00 AM    MCV 87 02/11/2022 12:00 AM     Lab Results   Component Value Date/Time    Hemoglobin A1c 10.1 (H) 02/11/2022 12:00 AM    Hemoglobin A1c 7.4 (H) 11/25/2015 08:41 AM    Hemoglobin A1c 6.9 (H) 09/30/2015 07:57 AM    Glucose 175 (H) 02/11/2022 12:00 AM    Glucose 277 (H) 02/12/2010 09:03 AM    Glucose (POC) 157 (H) 07/31/2012 07:54 AM    Microalbumin/Creat ratio (mg/g creat) 1,305 (H) 03/11/2022 11:41 AM    Microalbumin,urine random 154.00 03/11/2022 11:41 AM    LDL, calculated 89 02/11/2022 12:00 AM    LDL, calculated 63 11/25/2015 08:41 AM    Creatinine (POC) 1.0 07/28/2012 06:53 AM    Creatinine 0.69 (L) 02/11/2022 12:00 AM      Lab Results   Component Value Date/Time    Cholesterol, total 150 02/11/2022 12:00 AM    HDL Cholesterol 43 02/11/2022 12:00 AM    LDL, calculated 89 02/11/2022 12:00 AM    LDL, calculated 63 11/25/2015 08:41 AM    Triglyceride 98 02/11/2022 12:00 AM    CHOL/HDL Ratio 5.3 (H) 07/29/2012 05:00 PM     Lab Results   Component Value Date/Time    ALT (SGPT) 17 02/11/2022 12:00 AM    Alk.  phosphatase 63 02/11/2022 12:00 AM    Bilirubin, direct 0.1 02/12/2010 09:06 AM    Bilirubin, total 0.3 02/11/2022 12:00 AM    Albumin 3.9 02/11/2022 12:00 AM    Protein, total 6.3 02/11/2022 12:00 AM    INR (POC) 1.0 07/28/2012 06:59 AM    PLATELET 785 43/62/8824 12:00 AM    Hepatitis C Ab Negative 02/12/2010 09:06 AM    Hep B surface Ag Negative 02/12/2010 09:06 AM     Lab Results   Component Value Date/Time    GFR est non- 02/11/2022 12:00 AM    GFRNA, POC >60 07/28/2012 06:53 AM    GFR est  02/11/2022 12:00 AM    GFRAA, POC >60 07/28/2012 06:53 AM    Creatinine 0.69 (L) 02/11/2022 12:00 AM    Creatinine (POC) 1.0 07/28/2012 06:53 AM    BUN 24 02/11/2022 12:00 AM    BUN (POC) 16 07/28/2012 06:53 AM    Sodium 140 02/11/2022 12:00 AM    Sodium (POC) 138 07/28/2012 06:53 AM    Potassium 4.6 02/11/2022 12:00 AM    Potassium (POC) 3.8 07/28/2012 06:53 AM    Chloride 100 02/11/2022 12:00 AM    Chloride (POC) 104 07/28/2012 06:53 AM    CO2 24 02/11/2022 12:00 AM    Magnesium 1.8 07/28/2012 06:51 AM     Lab Results   Component Value Date/Time    TSH 1.370 11/25/2015 08:41 AM    TSH 1.300 11/19/2013 01:58 PM         Review of Systems  A comprehensive review of systems was negative except for that written in the HPI.     Objective:     Visit Vitals  BP 90/66 (BP 1 Location: Left upper arm, BP Patient Position: Sitting, BP Cuff Size: Small adult)   Pulse 83   Temp 97.6 °F (36.4 °C) (Oral)   Resp 14   Ht 6' 1\" (1.854 m)   Wt 168 lb (76.2 kg)   SpO2 99%   BMI 22.16 kg/m²     Physical Examination: General appearance - alert, well appearing, and in no distress and oriented to person, place, and time  Mental status - normal mood, behavior, speech, dress, motor activity, and thought processes  Eyes - sclera anicteric  Neck - supple, no significant adenopathy, thyroid exam: thyroid is normal in size without nodules or tenderness  Chest - clear to auscultation, no wheezes, rales or rhonchi, symmetric air entry  Heart - normal rate, regular rhythm, normal S1, S2, no murmurs, rubs, clicks or gallops, normal bilateral carotid upstroke without bruits, no JVD  Abdomen - soft, nontender, nondistended, no masses or organomegaly  bowel sounds normal  Neurological - alert, oriented, normal speech, no focal findings or movement disorder noted  Extremities - no pedal edema noted, intact peripheral pulses, no edema, redness or tenderness in the calves or thighs  Skin - normal coloration and turgor, no rashes      Assessment/Plan:       Diabetic issues reviewed with him: low cholesterol diet, weight control and daily exercise discussed, home glucose monitoring emphasized, all medications, side effects and compliance discussed carefully, foot care discussed and Podiatry visits discussed, annual eye examinations at Ophthalmology discussed, glycohemoglobin and other lab monitoring discussed and long term diabetic complications discussed. Diagnoses and all orders for this visit:    1. Type 2 diabetes mellitus with hyperglycemia, without long-term current use of insulin ([facility] [address]  Home monitoring is trending toward goal  Discussed with patient variability in his log indicates inconsistency with dietary compliance. Reviewed diet recommendations including reducing sugar and simple carbohydrates, increasing protein and fiber. He has been eating pudding as a snack each evening and will replace this with a protein-based snack. Continue glipizide, Metformin  Repeat A1c in 2 months  -     glipiZIDE (GLUCOTROL) 5 mg tablet; Take 1 Tablet by mouth two (2) times daily (with meals). -     metFORMIN (GLUCOPHAGE) 500 mg tablet; Take 2 Tablets by mouth two (2) times daily (with meals). -     MICROALBUMIN, UR, RAND W/ MICROALB/CREAT RATIO; Future    2. Primary hypertension  Below goal with systolic pressure in the 66N but asymptomatic  Current medications, lisinopril and metoprolol succinate, are needed for heart failure preventative therapy, will continue meds at current doses  Low-sodium diet recommended    3. Congestive heart failure, unspecified HF chronicity, unspecified heart failure type ([facility] [address]  No acute symptoms  Not currently on a diuretic  Continue Metformin and lisinopril  Follow-up with cardiology as scheduled    4. Benign prostatic hyperplasia with urinary retention  Urinary catheter in place due to retention with multiple failed voiding trials  Prostate biopsy is scheduled  Follow-up with urology as planned    5. Bilateral foot-drop  Encourage patient to start physical therapy as scheduled  MRI is scheduled next week  Follow-up with Ortho as scheduled next week  [employer] handicap placard application completed and returned to patient at time of visit. Patient will make an appointment with the [employer] to obtain.     Follow-up and Dispositions    · Return in about 2 months (around 5/11/2022), or if symptoms worsen or fail to improve, for diabetes. I have discussed the diagnosis with the patient and the intended plan as seen in the above orders. The patient has received an after-visit summary and questions were answered concerning future plans. Patient conveyed understanding of the plan at the time of the visit.     Jaun Parra, NP

## 2023-02-10 RX ORDER — METFORMIN HYDROCHLORIDE 500 MG/1
TABLET ORAL
Qty: 360 TABLET | Refills: 0 | Status: SHIPPED | OUTPATIENT
Start: 2023-02-10

## 2023-05-08 RX ORDER — GLIPIZIDE 5 MG/1
TABLET ORAL
Qty: 180 TABLET | Refills: 0 | Status: SHIPPED | OUTPATIENT
Start: 2023-05-08

## 2023-07-31 ENCOUNTER — TELEPHONE (OUTPATIENT)
Age: 68
End: 2023-07-31

## 2023-07-31 RX ORDER — GLIPIZIDE 5 MG/1
5 TABLET ORAL 2 TIMES DAILY WITH MEALS
Qty: 60 TABLET | Refills: 0 | Status: SHIPPED | OUTPATIENT
Start: 2023-07-31 | End: 2023-08-28

## 2023-07-31 NOTE — TELEPHONE ENCOUNTER
Pt came into the office to schedule his appointment for labs and med refill, he is scheduled for 8/30 at 920, Pt stated that he will be out of his Metformin and Glipizide in the next week and a half and would like to know if he is able to get refills for these 2 meds to get him to his appointment.     Please call Pt and advise him if this can be done    # [phone]

## 2023-08-28 RX ORDER — GLIPIZIDE 5 MG/1
TABLET ORAL
Qty: 60 TABLET | Refills: 0 | Status: SHIPPED | OUTPATIENT
Start: 2023-08-28

## 2023-08-30 ENCOUNTER — OFFICE VISIT (OUTPATIENT)
Age: 68
End: 2023-08-30
Payer: MEDICARE

## 2023-08-30 VITALS
DIASTOLIC BLOOD PRESSURE: 79 MMHG | BODY MASS INDEX: 26.95 KG/M2 | RESPIRATION RATE: 16 BRPM | HEART RATE: 74 BPM | WEIGHT: 199 LBS | SYSTOLIC BLOOD PRESSURE: 117 MMHG | OXYGEN SATURATION: 98 % | TEMPERATURE: 97.8 F | HEIGHT: 72 IN

## 2023-08-30 DIAGNOSIS — E11.65 TYPE 2 DIABETES MELLITUS WITH HYPERGLYCEMIA, WITHOUT LONG-TERM CURRENT USE OF INSULIN (HCC): ICD-10-CM

## 2023-08-30 DIAGNOSIS — Z12.11 SCREEN FOR COLON CANCER: ICD-10-CM

## 2023-08-30 DIAGNOSIS — I10 ESSENTIAL (PRIMARY) HYPERTENSION: ICD-10-CM

## 2023-08-30 DIAGNOSIS — Z00.00 MEDICARE ANNUAL WELLNESS VISIT, SUBSEQUENT: Primary | ICD-10-CM

## 2023-08-30 DIAGNOSIS — E78.5 HYPERLIPIDEMIA LDL GOAL <70: ICD-10-CM

## 2023-08-30 DIAGNOSIS — Z23 NEED FOR PROPHYLACTIC VACCINATION AGAINST DIPHTHERIA-TETANUS-PERTUSSIS (DTP): ICD-10-CM

## 2023-08-30 DIAGNOSIS — Z11.59 NEED FOR HEPATITIS C SCREENING TEST: ICD-10-CM

## 2023-08-30 DIAGNOSIS — Z13.6 SCREENING FOR AAA (ABDOMINAL AORTIC ANEURYSM): ICD-10-CM

## 2023-08-30 DIAGNOSIS — Z23 NEED FOR PROPHYLACTIC VACCINATION AND INOCULATION AGAINST VARICELLA: ICD-10-CM

## 2023-08-30 DIAGNOSIS — Z12.5 SCREENING FOR PROSTATE CANCER: ICD-10-CM

## 2023-08-30 DIAGNOSIS — L30.9 DERMATITIS: ICD-10-CM

## 2023-08-30 DIAGNOSIS — I50.9 HEART FAILURE, UNSPECIFIED HF CHRONICITY, UNSPECIFIED HEART FAILURE TYPE (HCC): ICD-10-CM

## 2023-08-30 LAB
ALBUMIN SERPL-MCNC: 3.4 G/DL (ref 3.5–5)
ALBUMIN/GLOB SERPL: 1.1 (ref 1.1–2.2)
ALP SERPL-CCNC: 93 U/L (ref 45–117)
ALT SERPL-CCNC: 16 U/L (ref 12–78)
ANION GAP SERPL CALC-SCNC: 6 MMOL/L (ref 5–15)
AST SERPL-CCNC: 8 U/L (ref 15–37)
BILIRUB SERPL-MCNC: 0.6 MG/DL (ref 0.2–1)
BUN SERPL-MCNC: 18 MG/DL (ref 6–20)
BUN/CREAT SERPL: 19 (ref 12–20)
CALCIUM SERPL-MCNC: 8.9 MG/DL (ref 8.5–10.1)
CHLORIDE SERPL-SCNC: 104 MMOL/L (ref 97–108)
CHOLEST SERPL-MCNC: 124 MG/DL
CO2 SERPL-SCNC: 28 MMOL/L (ref 21–32)
CREAT SERPL-MCNC: 0.97 MG/DL (ref 0.7–1.3)
CREAT UR-MCNC: 174 MG/DL
ERYTHROCYTE [DISTWIDTH] IN BLOOD BY AUTOMATED COUNT: 12 % (ref 11.5–14.5)
EST. AVERAGE GLUCOSE BLD GHB EST-MCNC: 148 MG/DL
GLOBULIN SER CALC-MCNC: 3 G/DL (ref 2–4)
GLUCOSE SERPL-MCNC: 159 MG/DL (ref 65–100)
HBA1C MFR BLD: 6.8 % (ref 4–5.6)
HCT VFR BLD AUTO: 45 % (ref 36.6–50.3)
HDLC SERPL-MCNC: 32 MG/DL
HDLC SERPL: 3.9 (ref 0–5)
HGB BLD-MCNC: 14.3 G/DL (ref 12.1–17)
LDLC SERPL CALC-MCNC: 69.2 MG/DL (ref 0–100)
MCH RBC QN AUTO: 28.8 PG (ref 26–34)
MCHC RBC AUTO-ENTMCNC: 31.8 G/DL (ref 30–36.5)
MCV RBC AUTO: 90.7 FL (ref 80–99)
MICROALBUMIN UR-MCNC: 0.69 MG/DL
MICROALBUMIN/CREAT UR-RTO: 4 MG/G (ref 0–30)
NRBC # BLD: 0 K/UL (ref 0–0.01)
NRBC BLD-RTO: 0 PER 100 WBC
PLATELET # BLD AUTO: 285 K/UL (ref 150–400)
PMV BLD AUTO: 10 FL (ref 8.9–12.9)
POTASSIUM SERPL-SCNC: 4.5 MMOL/L (ref 3.5–5.1)
PROT SERPL-MCNC: 6.4 G/DL (ref 6.4–8.2)
PSA SERPL-MCNC: 0.7 NG/ML (ref 0.01–4)
RBC # BLD AUTO: 4.96 M/UL (ref 4.1–5.7)
SODIUM SERPL-SCNC: 138 MMOL/L (ref 136–145)
TRIGL SERPL-MCNC: 114 MG/DL
TSH SERPL DL<=0.05 MIU/L-ACNC: 0.82 UIU/ML (ref 0.36–3.74)
VLDLC SERPL CALC-MCNC: 22.8 MG/DL
WBC # BLD AUTO: 7.3 K/UL (ref 4.1–11.1)

## 2023-08-30 PROCEDURE — 99214 OFFICE O/P EST MOD 30 MIN: CPT | Performed by: NURSE PRACTITIONER

## 2023-08-30 PROCEDURE — 3074F SYST BP LT 130 MM HG: CPT | Performed by: NURSE PRACTITIONER

## 2023-08-30 PROCEDURE — 3017F COLORECTAL CA SCREEN DOC REV: CPT | Performed by: NURSE PRACTITIONER

## 2023-08-30 PROCEDURE — G0439 PPPS, SUBSEQ VISIT: HCPCS | Performed by: NURSE PRACTITIONER

## 2023-08-30 PROCEDURE — 2022F DILAT RTA XM EVC RTNOPTHY: CPT | Performed by: NURSE PRACTITIONER

## 2023-08-30 PROCEDURE — 1123F ACP DISCUSS/DSCN MKR DOCD: CPT | Performed by: NURSE PRACTITIONER

## 2023-08-30 PROCEDURE — G8427 DOCREV CUR MEDS BY ELIG CLIN: HCPCS | Performed by: NURSE PRACTITIONER

## 2023-08-30 PROCEDURE — 1036F TOBACCO NON-USER: CPT | Performed by: NURSE PRACTITIONER

## 2023-08-30 PROCEDURE — 3078F DIAST BP <80 MM HG: CPT | Performed by: NURSE PRACTITIONER

## 2023-08-30 PROCEDURE — G8419 CALC BMI OUT NRM PARAM NOF/U: HCPCS | Performed by: NURSE PRACTITIONER

## 2023-08-30 PROCEDURE — 3044F HG A1C LEVEL LT 7.0%: CPT | Performed by: NURSE PRACTITIONER

## 2023-08-30 RX ORDER — HYDROXYZINE HYDROCHLORIDE 25 MG/1
25 TABLET, FILM COATED ORAL EVERY 8 HOURS PRN
Qty: 30 TABLET | Refills: 0 | Status: SHIPPED | OUTPATIENT
Start: 2023-08-30 | End: 2023-09-09

## 2023-08-30 RX ORDER — PREDNISONE 10 MG/1
TABLET ORAL
Qty: 30 TABLET | Refills: 0 | Status: SHIPPED | OUTPATIENT
Start: 2023-08-30

## 2023-08-30 RX ORDER — LISINOPRIL 5 MG/1
5 TABLET ORAL DAILY
COMMUNITY
Start: 2023-08-07

## 2023-08-30 SDOH — ECONOMIC STABILITY: INCOME INSECURITY: HOW HARD IS IT FOR YOU TO PAY FOR THE VERY BASICS LIKE FOOD, HOUSING, MEDICAL CARE, AND HEATING?: NOT VERY HARD

## 2023-08-30 SDOH — ECONOMIC STABILITY: FOOD INSECURITY: WITHIN THE PAST 12 MONTHS, THE FOOD YOU BOUGHT JUST DIDN'T LAST AND YOU DIDN'T HAVE MONEY TO GET MORE.: NEVER TRUE

## 2023-08-30 SDOH — ECONOMIC STABILITY: FOOD INSECURITY: WITHIN THE PAST 12 MONTHS, YOU WORRIED THAT YOUR FOOD WOULD RUN OUT BEFORE YOU GOT MONEY TO BUY MORE.: NEVER TRUE

## 2023-08-30 SDOH — ECONOMIC STABILITY: HOUSING INSECURITY
IN THE LAST 12 MONTHS, WAS THERE A TIME WHEN YOU DID NOT HAVE A STEADY PLACE TO SLEEP OR SLEPT IN A SHELTER (INCLUDING NOW)?: NO

## 2023-08-30 ASSESSMENT — LIFESTYLE VARIABLES
HOW OFTEN DO YOU HAVE A DRINK CONTAINING ALCOHOL: NEVER
HOW MANY STANDARD DRINKS CONTAINING ALCOHOL DO YOU HAVE ON A TYPICAL DAY: PATIENT DOES NOT DRINK

## 2023-08-30 ASSESSMENT — PATIENT HEALTH QUESTIONNAIRE - PHQ9
SUM OF ALL RESPONSES TO PHQ QUESTIONS 1-9: 0
2. FEELING DOWN, DEPRESSED OR HOPELESS: 0
SUM OF ALL RESPONSES TO PHQ QUESTIONS 1-9: 0
SUM OF ALL RESPONSES TO PHQ9 QUESTIONS 1 & 2: 0
SUM OF ALL RESPONSES TO PHQ QUESTIONS 1-9: 0
SUM OF ALL RESPONSES TO PHQ QUESTIONS 1-9: 0
2. FEELING DOWN, DEPRESSED OR HOPELESS: 0
SUM OF ALL RESPONSES TO PHQ QUESTIONS 1-9: 0
1. LITTLE INTEREST OR PLEASURE IN DOING THINGS: 0
1. LITTLE INTEREST OR PLEASURE IN DOING THINGS: 0
SUM OF ALL RESPONSES TO PHQ9 QUESTIONS 1 & 2: 0
SUM OF ALL RESPONSES TO PHQ QUESTIONS 1-9: 0

## 2023-08-30 NOTE — ACP (ADVANCE CARE PLANNING)
Advance Care Planning     Advance Care Planning (ACP) Provider Conversation Snapshot     Date of ACP Conversation: 8/30/2023    Persons included in Conversation:  patient  Length of ACP Conversation in minutes:  <16 minutes (Non-Billable)     Authorized Decision Maker (if patient is incapable of making informed decisions): This person is: Other Legally Authorized Decision Maker (e.g. Next of Kin)                                                       For Patients with Decision Making Capacity:   Values/Goals: Exploration of values, goals, and preferences if recovery is not expected, even with continued medical treatment in the event of:  Imminent death  Severe, permanent brain injury  \"In these circumstances, what matters most to you? \"  Care focused more on comfort or quality of life. Conversation Outcomes / Follow-Up Plan:   Recommended completion of Advance Directive form after review of ACP materials and conversation with prospective healthcare agent   Recommended communicating the plan and making copies for the healthcare agent, personal physician, and others as appropriate (e.g., health system)  Recommended review of completed ACP document annually or upon change in health status      Information and blank forms given for review and completion. Will f/u on status at next visit.     MIRNA Mcfarlane NP

## 2023-08-30 NOTE — PROGRESS NOTES
Progress Note    he is a 80y.o. year old male who presents for evaluation of Follow-up, Diabetes (Patient here to follow up on Type 2 DM. Patient reports that he has been breaking out on arms, back and chest. He reports it has been unbearably itchy. He reports that he has not used any new soaps, lotions, or detergent. No new foods, medications or exposures.  ),        Assessment/ Plan:     Manish Whelan was seen today for follow-up, diabetes and medicare awv. Diagnoses and all orders for this visit:      Type 2 diabetes mellitus with hyperglycemia, without long-term current use of insulin ([address])  At goal at last check, overdue for repeat A1c  Continue glipizide 1/2 tablet twice a day, metformin 1000 mg twice a day pending results  Encouraged improved compliance with diet and exercise recommendations  Encouraged weight loss  Encouraged home monitoring of glucose  Patient is reminded to schedule diabetic eye exam  -     Hemoglobin A1C; Future  -     Microalbumin / Creatinine Urine Ratio; Future  -     Microalbumin / Creatinine Urine Ratio  -     Hemoglobin A1C    Essential (primary) hypertension  At goal  Continue metoprolol succinate 50 mg daily, lisinopril 10 mg daily  Low-sodium diet recommended  Weight loss recommended  150 minutes of exercise weekly recommended  -     CBC; Future  -     TSH; Future  -     TSH  -     CBC    Hyperlipidemia LDL goal <70  LDL at goal at last check  Repeat fasting lipid panel today  Continue atorvastatin 80 mg daily pending results  -     Lipid Panel; Future  -     Comprehensive Metabolic Panel;  Future  -     Comprehensive Metabolic Panel  -     Lipid Panel    Dermatitis  Contact dermatitis versus allergic response  Rash is widespread, will treat with oral prednisone and add hydroxyzine for nighttime itching  Return to clinic if symptoms do not resolve for further evaluation  -     predniSONE (DELTASONE) 10 MG tablet; Day 1-2 take 5 tabs, day 3-4 take 4 tabs, day 5-6 take 3 tabs, day 7-8

## 2023-08-30 NOTE — PROGRESS NOTES
Medicare Annual Wellness Visit    Charley Rodriguez is here for Follow-up, Diabetes (Patient here to follow up on Type 2 DM. Patient reports that he has been breaking out on arms, back and chest. He reports it has been unbearably itchy. He reports that he has not used any new soaps, lotions, or detergent. No new foods, medications or exposures.  ), and Medicare AWV    Assessment & Plan   Medicare annual wellness visit, subsequent  Concerns addressed today:  AAA screening  Hep c screening  Prostate cancer screening  Colon cancer screening  Immunizations recommended: Tdap, shingrix, influenza  Falls prevention  Vision screening/diabetic eye exam overdue  Sedentary lifestyle    Screening for AAA (abdominal aortic aneurysm)  -     Vascular AAA screening; Future    Need for hepatitis C screening test  -     Hepatitis C Antibody; Future    Need for prophylactic vaccination against diphtheria-tetanus-pertussis (DTP)  -     Tdap (ADACEL) 5-2-15.5 LF-MCG/0.5 injection; Inject 0.5 mLs into the muscle once for 1 dose, Disp-0.5 mL, R-0Print    Need for prophylactic vaccination and inoculation against varicella  -     zoster recombinant adjuvanted vaccine [facility]) 50 MCG/0.5ML SUSR injection; Inject 0.5 mLs into the muscle once for 1 dose, Disp-0.5 mL, R-0Print    Screening for prostate cancer  -     PSA Screening; Future    Screen for colon cancer  Patient is at average risk for colon cancer. Reviewed screening options including FIT testing, cologuard, and colonoscopy. Patient has not had an initial screening. Prefers cologuard, order entered. -     Cologuard (Fecal DNA Colorectal Cancer Screening)      Recommendations for Preventive Services Due: see orders and patient instructions/AVS.  Recommended screening schedule for the next 5-10 years is provided to the patient in written form: see Patient Instructions/AVS.     Follow up:  Next medicare wellness visit recommended in 12 months.     I have discussed the diagnosis with the

## 2023-08-31 LAB
HCV AB SER IA-ACNC: 0.08 INDEX
HCV AB SERPL QL IA: NONREACTIVE

## 2023-09-08 ENCOUNTER — TELEPHONE (OUTPATIENT)
Age: 68
End: 2023-09-08

## 2023-09-08 NOTE — TELEPHONE ENCOUNTER
Pt still has the rash and itching on his chest he has finished the med's you sent in last time can you call something else in

## 2023-09-13 RX ORDER — HYDROXYZINE HYDROCHLORIDE 25 MG/1
25 TABLET, FILM COATED ORAL EVERY 8 HOURS PRN
Qty: 30 TABLET | Refills: 0 | Status: SHIPPED | OUTPATIENT
Start: 2023-09-13 | End: 2023-09-23

## 2023-09-13 NOTE — TELEPHONE ENCOUNTER
Sent hydroxyzine for itching.  He needs to see dermatology for this, recommend [facility] or [facility] in [facility]

## 2023-10-08 LAB — NONINV COLON CA DNA+OCC BLD SCRN STL QL: NEGATIVE

## 2024-01-03 ENCOUNTER — TELEPHONE (OUTPATIENT)
Age: 69
End: 2024-01-03

## 2024-01-03 NOTE — TELEPHONE ENCOUNTER
Adrian Garay needs a refill of metFORMIN (GLUCOPHAGE) 500 MG table .  They have 12 pills/supply left and are requesting a 90 day supply with refills.  Pharmacy has been updated in the chart. Patient was advised or scheduled an appointment for the future and to request refills thru the [employer] [facility] or by requesting a refill from their pharmacy in the future.  Patient was also advised to check with their pharmacy for status of when refills are available.      Pt has an appt on 01/26/24

## 2024-01-26 ENCOUNTER — OFFICE VISIT (OUTPATIENT)
Age: 69
End: 2024-01-26
Payer: MEDICARE

## 2024-01-26 VITALS
SYSTOLIC BLOOD PRESSURE: 112 MMHG | HEART RATE: 81 BPM | DIASTOLIC BLOOD PRESSURE: 76 MMHG | HEIGHT: 72 IN | WEIGHT: 202.4 LBS | BODY MASS INDEX: 27.41 KG/M2 | TEMPERATURE: 98.2 F | OXYGEN SATURATION: 95 %

## 2024-01-26 DIAGNOSIS — E78.5 HYPERLIPIDEMIA LDL GOAL <70: ICD-10-CM

## 2024-01-26 DIAGNOSIS — L30.9 DERMATITIS: ICD-10-CM

## 2024-01-26 DIAGNOSIS — E11.65 TYPE 2 DIABETES MELLITUS WITH HYPERGLYCEMIA, WITHOUT LONG-TERM CURRENT USE OF INSULIN (HCC): Primary | ICD-10-CM

## 2024-01-26 DIAGNOSIS — I10 ESSENTIAL (PRIMARY) HYPERTENSION: ICD-10-CM

## 2024-01-26 DIAGNOSIS — I50.9 HEART FAILURE, UNSPECIFIED HF CHRONICITY, UNSPECIFIED HEART FAILURE TYPE (HCC): ICD-10-CM

## 2024-01-26 DIAGNOSIS — K21.9 GASTROESOPHAGEAL REFLUX DISEASE WITHOUT ESOPHAGITIS: ICD-10-CM

## 2024-01-26 PROCEDURE — 2022F DILAT RTA XM EVC RTNOPTHY: CPT | Performed by: NURSE PRACTITIONER

## 2024-01-26 PROCEDURE — G8419 CALC BMI OUT NRM PARAM NOF/U: HCPCS | Performed by: NURSE PRACTITIONER

## 2024-01-26 PROCEDURE — G8484 FLU IMMUNIZE NO ADMIN: HCPCS | Performed by: NURSE PRACTITIONER

## 2024-01-26 PROCEDURE — 99214 OFFICE O/P EST MOD 30 MIN: CPT | Performed by: NURSE PRACTITIONER

## 2024-01-26 PROCEDURE — 3051F HG A1C>EQUAL 7.0%<8.0%: CPT | Performed by: NURSE PRACTITIONER

## 2024-01-26 PROCEDURE — 3017F COLORECTAL CA SCREEN DOC REV: CPT | Performed by: NURSE PRACTITIONER

## 2024-01-26 PROCEDURE — 3078F DIAST BP <80 MM HG: CPT | Performed by: NURSE PRACTITIONER

## 2024-01-26 PROCEDURE — 1036F TOBACCO NON-USER: CPT | Performed by: NURSE PRACTITIONER

## 2024-01-26 PROCEDURE — 1123F ACP DISCUSS/DSCN MKR DOCD: CPT | Performed by: NURSE PRACTITIONER

## 2024-01-26 PROCEDURE — 3074F SYST BP LT 130 MM HG: CPT | Performed by: NURSE PRACTITIONER

## 2024-01-26 PROCEDURE — G8427 DOCREV CUR MEDS BY ELIG CLIN: HCPCS | Performed by: NURSE PRACTITIONER

## 2024-01-26 RX ORDER — DOXYCYCLINE HYCLATE 100 MG
100 TABLET ORAL 2 TIMES DAILY
Qty: 14 TABLET | Refills: 0 | Status: SHIPPED | OUTPATIENT
Start: 2024-01-26 | End: 2024-02-02

## 2024-01-26 RX ORDER — OMEPRAZOLE 40 MG/1
40 CAPSULE, DELAYED RELEASE ORAL DAILY
Qty: 90 CAPSULE | Refills: 1 | Status: SHIPPED | OUTPATIENT
Start: 2024-01-26

## 2024-01-26 RX ORDER — GLIPIZIDE 5 MG/1
5 TABLET ORAL 2 TIMES DAILY WITH MEALS
Qty: 180 TABLET | Refills: 1 | Status: SHIPPED | OUTPATIENT
Start: 2024-01-26

## 2024-01-26 NOTE — PROGRESS NOTES
Adrian Garay is a 68 y.o. male , id x 2(name and ). Reviewed record, history, and  medications.      Chief Complaint   Patient presents with    Medication Refill    Diabetes         Vitals:    24 1451   BP: 112/76   Pulse: 81   Temp: 98.2 °F (36.8 °C)   SpO2: 95%   Weight: 91.8 kg (202 lb 6.4 oz)   Height: 1.829 m (6')       Coordination of Care Questionnaire:   1. Have you been to the ER, urgent care clinic since your last visit?  Hospitalized since your last visit?No    2. Have you seen or consulted any other health care providers outside of the [facility] since your last visit?  Include any pap smears or colon screening. No          2023    10:04 AM   PHQ-9    Little interest or pleasure in doing things 0   Feeling down, depressed, or hopeless 0   PHQ-2 Score 0   PHQ-9 Total Score 0            No data to display                Social Determinants of Health     Tobacco Use: Medium Risk (2024)    Patient History     Smoking Tobacco Use: Former     Smokeless Tobacco Use: Never     Passive Exposure: Not on file   Alcohol Use: Not At Risk (2023)    AUDIT-C     Frequency of Alcohol Consumption: Never     Average Number of Drinks: Patient does not drink     Frequency of Binge Drinking: Never   Financial Resource Strain: Low Risk  (2023)    Overall Financial Resource Strain (CARDIA)     Difficulty of Paying Living Expenses: Not very hard   Food Insecurity: Not on file (2023)   Transportation Needs: Unknown (2023)    PRAPARE - Transportation     Lack of Transportation (Medical): Not on file     Lack of Transportation (Non-Medical): No   Physical Activity: Inactive (2023)    Exercise Vital Sign     Days of Exercise per Week: 0 days     Minutes of Exercise per Session: 0 min   Stress: Not on file   Social Connections: Not on file   Intimate Partner Violence: Not on file   Depression: Not at risk (2023)    PHQ-2     PHQ-2 Score: 0   Housing Stability: Unknown 
71  diabetic  hypertension  hyperlipidemia  sedentary lifestyle.   Compliance with treatment thus far has been fair.   His dispense history shows no fills of atorvastatin in the past 6 months or more.    Diet and Lifestyle: generally follows a low fat low cholesterol diet, generally follows a low sodium diet, follows a diabetic diet regularly, sedentary, nonsmoker  Home BP Monitoring: is well controlled at home    Cardiovascular ROS: taking medications as instructed, no medication side effects noted, no TIA's, no chest pain on exertion, no dyspnea on exertion, no swelling of ankles, no orthostatic dizziness or lightheadedness, no orthopnea or paroxysmal nocturnal dyspnea, no palpitations, no intermittent claudication symptoms.     Reviewed PmHx, RxHx, FmHx, SocHx, AllgHx and updated and dated in the chart.    Review of Systems - negative except as listed above in the HPI    Objective:     Vitals:    01/26/24 1451   BP: 112/76   Pulse: 81   Temp: 98.2 °F (36.8 °C)   SpO2: 95%   Weight: 91.8 kg (202 lb 6.4 oz)   Height: 1.829 m (6')       Current Outpatient Medications   Medication Sig    glipiZIDE (GLUCOTROL) 5 MG tablet Take 1 tablet by mouth 2 times daily (with meals)    metFORMIN (GLUCOPHAGE) 500 MG tablet TAKE 2 TABLETS BY MOUTH TWICE DAILY WITH MEALS    omeprazole (PRILOSEC) 40 MG delayed release capsule Take 1 capsule by mouth daily    doxycycline hyclate (VIBRA-TABS) 100 MG tablet Take 1 tablet by mouth 2 times daily for 7 days    lisinopril (PRINIVIL;ZESTRIL) 5 MG tablet Take 1 tablet by mouth daily    aspirin 81 MG EC tablet Take 1 tablet by mouth daily    atorvastatin (LIPITOR) 80 MG tablet Take 1 tablet by mouth daily    lisinopril (PRINIVIL;ZESTRIL) 10 MG tablet Take 1 tablet by mouth daily    magnesium oxide (MAG-OX) 400 MG tablet Take 1 tablet by mouth daily    metoprolol succinate (TOPROL XL) 50 MG extended release tablet 1 tablet     No current facility-administered medications for this visit.

## 2024-01-27 LAB
ALBUMIN SERPL-MCNC: 3.4 G/DL (ref 3.5–5)
ALBUMIN/GLOB SERPL: 1.1 (ref 1.1–2.2)
ALP SERPL-CCNC: 98 U/L (ref 45–117)
ALT SERPL-CCNC: 13 U/L (ref 12–78)
ANION GAP SERPL CALC-SCNC: 7 MMOL/L (ref 5–15)
AST SERPL-CCNC: 7 U/L (ref 15–37)
BILIRUB SERPL-MCNC: 0.3 MG/DL (ref 0.2–1)
BUN SERPL-MCNC: 26 MG/DL (ref 6–20)
BUN/CREAT SERPL: 32 (ref 12–20)
CALCIUM SERPL-MCNC: 9.3 MG/DL (ref 8.5–10.1)
CHLORIDE SERPL-SCNC: 102 MMOL/L (ref 97–108)
CO2 SERPL-SCNC: 27 MMOL/L (ref 21–32)
CREAT SERPL-MCNC: 0.81 MG/DL (ref 0.7–1.3)
ERYTHROCYTE [DISTWIDTH] IN BLOOD BY AUTOMATED COUNT: 12.8 % (ref 11.5–14.5)
EST. AVERAGE GLUCOSE BLD GHB EST-MCNC: 160 MG/DL
GLOBULIN SER CALC-MCNC: 3 G/DL (ref 2–4)
GLUCOSE SERPL-MCNC: 81 MG/DL (ref 65–100)
HBA1C MFR BLD: 7.2 % (ref 4–5.6)
HCT VFR BLD AUTO: 43.2 % (ref 36.6–50.3)
HGB BLD-MCNC: 13.8 G/DL (ref 12.1–17)
MCH RBC QN AUTO: 28.1 PG (ref 26–34)
MCHC RBC AUTO-ENTMCNC: 31.9 G/DL (ref 30–36.5)
MCV RBC AUTO: 88 FL (ref 80–99)
NRBC # BLD: 0 K/UL (ref 0–0.01)
NRBC BLD-RTO: 0 PER 100 WBC
PLATELET # BLD AUTO: 308 K/UL (ref 150–400)
PMV BLD AUTO: 9.8 FL (ref 8.9–12.9)
POTASSIUM SERPL-SCNC: 4.4 MMOL/L (ref 3.5–5.1)
PROT SERPL-MCNC: 6.4 G/DL (ref 6.4–8.2)
RBC # BLD AUTO: 4.91 M/UL (ref 4.1–5.7)
SODIUM SERPL-SCNC: 136 MMOL/L (ref 136–145)
WBC # BLD AUTO: 7 K/UL (ref 4.1–11.1)

## 2024-01-28 PROBLEM — I48.0 PAROXYSMAL ATRIAL FIBRILLATION (HCC): Status: ACTIVE | Noted: 2024-01-28

## 2024-01-28 PROBLEM — I48.0 PAROXYSMAL ATRIAL FIBRILLATION (HCC): Status: RESOLVED | Noted: 2024-01-28 | Resolved: 2024-01-28

## 2024-01-28 RX ORDER — PIOGLITAZONEHYDROCHLORIDE 15 MG/1
15 TABLET ORAL DAILY
Qty: 90 TABLET | Refills: 0 | Status: SHIPPED | OUTPATIENT
Start: 2024-01-28

## 2024-01-28 RX ORDER — ATORVASTATIN CALCIUM 40 MG/1
40 TABLET, FILM COATED ORAL DAILY
Qty: 90 TABLET | Refills: 1 | Status: SHIPPED | OUTPATIENT
Start: 2024-01-28

## 2024-02-12 PROBLEM — R94.31 PROLONGED QT INTERVAL: Status: ACTIVE | Noted: 2024-02-12

## 2024-02-12 PROBLEM — I25.5 ISCHEMIC CARDIOMYOPATHY: Status: ACTIVE | Noted: 2024-02-12

## 2024-02-12 PROBLEM — Z86.79 HISTORY OF ATRIAL FIBRILLATION: Status: ACTIVE | Noted: 2024-02-12

## 2024-02-12 PROBLEM — I50.20 HFREF (HEART FAILURE WITH REDUCED EJECTION FRACTION) (HCC): Status: ACTIVE | Noted: 2024-02-12

## 2024-05-10 DIAGNOSIS — E11.65 TYPE 2 DIABETES MELLITUS WITH HYPERGLYCEMIA, WITHOUT LONG-TERM CURRENT USE OF INSULIN (HCC): ICD-10-CM

## 2024-05-14 RX ORDER — PIOGLITAZONEHYDROCHLORIDE 15 MG/1
15 TABLET ORAL DAILY
Qty: 90 TABLET | Refills: 0 | Status: SHIPPED | OUTPATIENT
Start: 2024-05-14

## 2024-07-18 DIAGNOSIS — E11.65 TYPE 2 DIABETES MELLITUS WITH HYPERGLYCEMIA, WITHOUT LONG-TERM CURRENT USE OF INSULIN (HCC): ICD-10-CM

## 2024-07-18 RX ORDER — GLIPIZIDE 5 MG/1
5 TABLET ORAL 2 TIMES DAILY WITH MEALS
Qty: 60 TABLET | Refills: 0 | Status: SHIPPED | OUTPATIENT
Start: 2024-07-18

## 2024-07-26 ENCOUNTER — OFFICE VISIT (OUTPATIENT)
Age: 69
End: 2024-07-26
Payer: MEDICARE

## 2024-07-26 VITALS
RESPIRATION RATE: 18 BRPM | HEIGHT: 72 IN | BODY MASS INDEX: 28.71 KG/M2 | OXYGEN SATURATION: 99 % | DIASTOLIC BLOOD PRESSURE: 82 MMHG | WEIGHT: 212 LBS | SYSTOLIC BLOOD PRESSURE: 117 MMHG | HEART RATE: 63 BPM

## 2024-07-26 DIAGNOSIS — M25.562 BILATERAL CHRONIC KNEE PAIN: ICD-10-CM

## 2024-07-26 DIAGNOSIS — E78.5 HYPERLIPIDEMIA LDL GOAL <70: ICD-10-CM

## 2024-07-26 DIAGNOSIS — G89.29 BILATERAL CHRONIC KNEE PAIN: ICD-10-CM

## 2024-07-26 DIAGNOSIS — E11.65 TYPE 2 DIABETES MELLITUS WITH HYPERGLYCEMIA, WITHOUT LONG-TERM CURRENT USE OF INSULIN (HCC): ICD-10-CM

## 2024-07-26 DIAGNOSIS — M25.561 BILATERAL CHRONIC KNEE PAIN: ICD-10-CM

## 2024-07-26 DIAGNOSIS — G62.9 NEUROPATHY: Primary | ICD-10-CM

## 2024-07-26 PROCEDURE — G8427 DOCREV CUR MEDS BY ELIG CLIN: HCPCS | Performed by: STUDENT IN AN ORGANIZED HEALTH CARE EDUCATION/TRAINING PROGRAM

## 2024-07-26 PROCEDURE — 3051F HG A1C>EQUAL 7.0%<8.0%: CPT | Performed by: STUDENT IN AN ORGANIZED HEALTH CARE EDUCATION/TRAINING PROGRAM

## 2024-07-26 PROCEDURE — 3074F SYST BP LT 130 MM HG: CPT | Performed by: STUDENT IN AN ORGANIZED HEALTH CARE EDUCATION/TRAINING PROGRAM

## 2024-07-26 PROCEDURE — 99214 OFFICE O/P EST MOD 30 MIN: CPT | Performed by: STUDENT IN AN ORGANIZED HEALTH CARE EDUCATION/TRAINING PROGRAM

## 2024-07-26 PROCEDURE — 1123F ACP DISCUSS/DSCN MKR DOCD: CPT | Performed by: STUDENT IN AN ORGANIZED HEALTH CARE EDUCATION/TRAINING PROGRAM

## 2024-07-26 PROCEDURE — 1036F TOBACCO NON-USER: CPT | Performed by: STUDENT IN AN ORGANIZED HEALTH CARE EDUCATION/TRAINING PROGRAM

## 2024-07-26 PROCEDURE — 3017F COLORECTAL CA SCREEN DOC REV: CPT | Performed by: STUDENT IN AN ORGANIZED HEALTH CARE EDUCATION/TRAINING PROGRAM

## 2024-07-26 PROCEDURE — G8419 CALC BMI OUT NRM PARAM NOF/U: HCPCS | Performed by: STUDENT IN AN ORGANIZED HEALTH CARE EDUCATION/TRAINING PROGRAM

## 2024-07-26 PROCEDURE — 3079F DIAST BP 80-89 MM HG: CPT | Performed by: STUDENT IN AN ORGANIZED HEALTH CARE EDUCATION/TRAINING PROGRAM

## 2024-07-26 PROCEDURE — 2022F DILAT RTA XM EVC RTNOPTHY: CPT | Performed by: STUDENT IN AN ORGANIZED HEALTH CARE EDUCATION/TRAINING PROGRAM

## 2024-07-26 RX ORDER — GLIPIZIDE 5 MG/1
5 TABLET ORAL 2 TIMES DAILY WITH MEALS
Qty: 180 TABLET | Refills: 1 | Status: SHIPPED | OUTPATIENT
Start: 2024-07-26

## 2024-07-26 RX ORDER — ATORVASTATIN CALCIUM 40 MG/1
40 TABLET, FILM COATED ORAL DAILY
Qty: 90 TABLET | Refills: 1 | Status: SHIPPED | OUTPATIENT
Start: 2024-07-26

## 2024-07-26 RX ORDER — PIOGLITAZONEHYDROCHLORIDE 15 MG/1
15 TABLET ORAL DAILY
Qty: 90 TABLET | Refills: 1 | Status: SHIPPED | OUTPATIENT
Start: 2024-07-26

## 2024-07-26 NOTE — PROGRESS NOTES
Chief Complaint   Patient presents with    Diabetes    Establish Care     Previous Saurav Patient.    Blood Work     Patient is fasting for labs today.       Patient gave verbal consent today for LUCIE.    Accompanied by: n/a    Home BP cuff present today:  no  Home medication list or bottles present today: no  Forms for completion: no    Chest pain/pressure/discomfort: no  Shortness of breath:  no  If new or worsening symptoms, consider EKG.      \"Have you been to the ER, urgent care clinic since your last visit?  Hospitalized since your last visit?\"    NO    “Have you seen or consulted any other health care providers outside of [facility] since your last visit?”    NO            Click Here for Release of Records Request  
Diabetes.  Medication refills will be provided.        Return for medicare wellness and diabetes follow-up 3-4 months.      I have discussed the diagnosis with the patient and the intended plan as seen in the above orders.    The patient has received an after-visit summary and questions were answered concerning future plans.   Pt conveyed understanding of plan.    Medication Side Effects and Warnings were discussed with patient    Current Outpatient Medications   Medication Sig    diclofenac sodium (VOLTAREN) 1 % GEL Apply 4 g topically 4 times daily    atorvastatin (LIPITOR) 40 MG tablet Take 1 tablet by mouth daily    glipiZIDE (GLUCOTROL) 5 MG tablet Take 1 tablet by mouth 2 times daily (with meals)    metFORMIN (GLUCOPHAGE) 500 MG tablet TAKE 2 TABLETS BY MOUTH TWICE DAILY WITH MEALS    pioglitazone (ACTOS) 15 MG tablet Take 1 tablet by mouth daily    omeprazole (PRILOSEC) 40 MG delayed release capsule Take 1 capsule by mouth daily    lisinopril (PRINIVIL;ZESTRIL) 5 MG tablet Take 1 tablet by mouth daily    aspirin 81 MG EC tablet Take 1 tablet by mouth daily    magnesium oxide (MAG-OX) 400 MG tablet Take 1 tablet by mouth daily    metoprolol succinate (TOPROL XL) 50 MG extended release tablet 1 tablet     No current facility-administered medications for this visit.         Subjective:     Chief Complaint   Patient presents with    Diabetes    Establish Care     Previous Saurav Patient.    Blood Work     Patient is fasting for labs today.     History of Present Illness  The patient is a 69-year-old who presents for evaluation of multiple medical concerns.    The patient was under the care of Saurav Singh NP for diabetes and cholesterol issues. He reports no adverse effects from his current medication regimen, which he has been taking for approximately 2 years. He was recently prescribed pioglitazone.    The patient reports chronic knee pain, which he attributes to severe neuropathy. He is uncertain if his knee

## 2024-07-29 LAB
ALBUMIN SERPL-MCNC: 4 G/DL (ref 3.9–4.9)
ALBUMIN/CREAT UR: <3 MG/G CREAT (ref 0–29)
ALP SERPL-CCNC: 95 IU/L (ref 44–121)
ALT SERPL-CCNC: 9 IU/L (ref 0–44)
AST SERPL-CCNC: 13 IU/L (ref 0–40)
BASOPHILS # BLD AUTO: 0 X10E3/UL (ref 0–0.2)
BASOPHILS NFR BLD AUTO: 1 %
BILIRUB SERPL-MCNC: 0.4 MG/DL (ref 0–1.2)
BUN SERPL-MCNC: 21 MG/DL (ref 8–27)
BUN/CREAT SERPL: 24 (ref 10–24)
CALCIUM SERPL-MCNC: 8.9 MG/DL (ref 8.6–10.2)
CHLORIDE SERPL-SCNC: 101 MMOL/L (ref 96–106)
CHOLEST SERPL-MCNC: 184 MG/DL (ref 100–199)
CO2 SERPL-SCNC: 19 MMOL/L (ref 20–29)
CREAT SERPL-MCNC: 0.89 MG/DL (ref 0.76–1.27)
CREAT UR-MCNC: 98.2 MG/DL
EGFRCR SERPLBLD CKD-EPI 2021: 93 ML/MIN/1.73
EOSINOPHIL # BLD AUTO: 0.3 X10E3/UL (ref 0–0.4)
EOSINOPHIL NFR BLD AUTO: 7 %
ERYTHROCYTE [DISTWIDTH] IN BLOOD BY AUTOMATED COUNT: 13.4 % (ref 11.6–15.4)
FERRITIN SERPL-MCNC: 45 NG/ML (ref 30–400)
FOLATE SERPL-MCNC: 5.8 NG/ML
GLOBULIN SER CALC-MCNC: 2.4 G/DL (ref 1.5–4.5)
GLUCOSE SERPL-MCNC: 111 MG/DL (ref 70–99)
HBA1C MFR BLD: 7 % (ref 4.8–5.6)
HCT VFR BLD AUTO: 43.3 % (ref 37.5–51)
HDLC SERPL-MCNC: 36 MG/DL
HGB BLD-MCNC: 14 G/DL (ref 13–17.7)
IMM GRANULOCYTES # BLD AUTO: 0 X10E3/UL (ref 0–0.1)
IMM GRANULOCYTES NFR BLD AUTO: 0 %
IMP & REVIEW OF LAB RESULTS: NORMAL
IRON SATN MFR SERPL: 27 % (ref 15–55)
IRON SERPL-MCNC: 97 UG/DL (ref 38–169)
LDLC SERPL CALC-MCNC: 121 MG/DL (ref 0–99)
LYMPHOCYTES # BLD AUTO: 1 X10E3/UL (ref 0.7–3.1)
LYMPHOCYTES NFR BLD AUTO: 21 %
Lab: NORMAL
MCH RBC QN AUTO: 27.9 PG (ref 26.6–33)
MCHC RBC AUTO-ENTMCNC: 32.3 G/DL (ref 31.5–35.7)
MCV RBC AUTO: 86 FL (ref 79–97)
MICROALBUMIN UR-MCNC: <3 UG/ML
MONOCYTES # BLD AUTO: 0.5 X10E3/UL (ref 0.1–0.9)
MONOCYTES NFR BLD AUTO: 10 %
NEUTROPHILS # BLD AUTO: 2.8 X10E3/UL (ref 1.4–7)
NEUTROPHILS NFR BLD AUTO: 61 %
PLATELET # BLD AUTO: 238 X10E3/UL (ref 150–450)
POTASSIUM SERPL-SCNC: 5 MMOL/L (ref 3.5–5.2)
PROT SERPL-MCNC: 6.4 G/DL (ref 6–8.5)
RBC # BLD AUTO: 5.01 X10E6/UL (ref 4.14–5.8)
SODIUM SERPL-SCNC: 139 MMOL/L (ref 134–144)
TIBC SERPL-MCNC: 358 UG/DL (ref 250–450)
TRIGL SERPL-MCNC: 152 MG/DL (ref 0–149)
TSH SERPL DL<=0.005 MIU/L-ACNC: 2.24 UIU/ML (ref 0.45–4.5)
UIBC SERPL-MCNC: 261 UG/DL (ref 111–343)
VIT B12 SERPL-MCNC: 379 PG/ML (ref 232–1245)
VLDLC SERPL CALC-MCNC: 27 MG/DL (ref 5–40)
WBC # BLD AUTO: 4.7 X10E3/UL (ref 3.4–10.8)

## 2025-02-10 ENCOUNTER — TELEPHONE (OUTPATIENT)
Facility: CLINIC | Age: 70
End: 2025-02-10

## 2025-02-10 SDOH — HEALTH STABILITY: PHYSICAL HEALTH: ON AVERAGE, HOW MANY DAYS PER WEEK DO YOU ENGAGE IN MODERATE TO STRENUOUS EXERCISE (LIKE A BRISK WALK)?: 7 DAYS

## 2025-02-10 SDOH — HEALTH STABILITY: PHYSICAL HEALTH: ON AVERAGE, HOW MANY MINUTES DO YOU ENGAGE IN EXERCISE AT THIS LEVEL?: 30 MIN

## 2025-02-10 ASSESSMENT — LIFESTYLE VARIABLES
HOW OFTEN DO YOU HAVE A DRINK CONTAINING ALCOHOL: 1
HOW OFTEN DO YOU HAVE SIX OR MORE DRINKS ON ONE OCCASION: 1
HOW MANY STANDARD DRINKS CONTAINING ALCOHOL DO YOU HAVE ON A TYPICAL DAY: 0

## 2025-02-19 ENCOUNTER — OFFICE VISIT (OUTPATIENT)
Facility: CLINIC | Age: 70
End: 2025-02-19
Payer: MEDICARE

## 2025-02-19 VITALS
OXYGEN SATURATION: 98 % | HEIGHT: 72 IN | WEIGHT: 210 LBS | BODY MASS INDEX: 28.44 KG/M2 | SYSTOLIC BLOOD PRESSURE: 125 MMHG | TEMPERATURE: 98.1 F | HEART RATE: 66 BPM | RESPIRATION RATE: 14 BRPM | DIASTOLIC BLOOD PRESSURE: 75 MMHG

## 2025-02-19 DIAGNOSIS — I15.9 SECONDARY HYPERTENSION: ICD-10-CM

## 2025-02-19 DIAGNOSIS — I25.83 CORONARY ARTERY DISEASE DUE TO LIPID RICH PLAQUE: ICD-10-CM

## 2025-02-19 DIAGNOSIS — K21.9 GASTROESOPHAGEAL REFLUX DISEASE WITHOUT ESOPHAGITIS: ICD-10-CM

## 2025-02-19 DIAGNOSIS — I25.10 CORONARY ARTERY DISEASE DUE TO LIPID RICH PLAQUE: ICD-10-CM

## 2025-02-19 DIAGNOSIS — I50.9 HEART FAILURE, UNSPECIFIED HF CHRONICITY, UNSPECIFIED HEART FAILURE TYPE (HCC): ICD-10-CM

## 2025-02-19 DIAGNOSIS — E11.42 TYPE 2 DIABETES MELLITUS WITH DIABETIC POLYNEUROPATHY, WITHOUT LONG-TERM CURRENT USE OF INSULIN (HCC): ICD-10-CM

## 2025-02-19 DIAGNOSIS — I50.20 HFREF (HEART FAILURE WITH REDUCED EJECTION FRACTION) (HCC): ICD-10-CM

## 2025-02-19 DIAGNOSIS — E78.5 HYPERLIPIDEMIA, UNSPECIFIED HYPERLIPIDEMIA TYPE: ICD-10-CM

## 2025-02-19 DIAGNOSIS — Z00.00 MEDICARE ANNUAL WELLNESS VISIT, SUBSEQUENT: Primary | ICD-10-CM

## 2025-02-19 DIAGNOSIS — R53.83 FATIGUE, UNSPECIFIED TYPE: ICD-10-CM

## 2025-02-19 PROCEDURE — 99214 OFFICE O/P EST MOD 30 MIN: CPT

## 2025-02-19 RX ORDER — ROSUVASTATIN CALCIUM 20 MG/1
20 TABLET, COATED ORAL DAILY
Status: ON HOLD | COMMUNITY

## 2025-02-19 RX ORDER — OMEPRAZOLE 40 MG/1
40 CAPSULE, DELAYED RELEASE ORAL DAILY
Qty: 90 CAPSULE | Refills: 1 | Status: ON HOLD | OUTPATIENT
Start: 2025-02-19

## 2025-02-19 ASSESSMENT — ENCOUNTER SYMPTOMS
CONSTIPATION: 0
SHORTNESS OF BREATH: 0
SINUS PAIN: 0
SORE THROAT: 0
COUGH: 0
DIARRHEA: 0
EYES NEGATIVE: 1
ABDOMINAL PAIN: 0
RHINORRHEA: 0
ALLERGIC/IMMUNOLOGIC NEGATIVE: 1

## 2025-02-19 NOTE — PROGRESS NOTES
Chief Complaint   Patient presents with    Medicare AWV    Diabetes    Hypertension    Lab Collection     Fasting today    Cholesterol Problem     Started Rosuvastatin 20 mg  Cardiologist: Dr Lawrence; US Scheduled for stent check     Patient states consent for provider to use [employer] System for documentation of this visit.    \"Have you been to the ER, urgent care clinic since your last visit?  Hospitalized since your last visit?\"    NO    “Have you seen or consulted any other health care providers outside our system since your last visit?”    NO      “Have you had a diabetic eye exam?”    NO     No diabetic eye exam on file

## 2025-02-19 NOTE — PROGRESS NOTES
Chief Complaint   Patient presents with    Medicare AWV    Diabetes    Hypertension    Lab Collection     Fasting today    Cholesterol Problem     Started Rosuvastatin 20 mg  Cardiologist: Dr Lawrence; US Scheduled for stent check     Patient states consent for provider to use [employer] System for documentation of this visit.    \"Have you been to the ER, urgent care clinic since your last visit?  Hospitalized since your last visit?\"    NO    “Have you seen or consulted any other health care providers outside our system since your last visit?”    NO      “Have you had a diabetic eye exam?”    NO     No diabetic eye exam on file            Medicare Annual Wellness Visit    Adrian Garay is here for Medicare AWV, Diabetes, Hypertension, Lab Collection (Fasting today), and Cholesterol Problem (Started Rosuvastatin 20 mg/Cardiologist: Dr Lawrence; US Scheduled for stent check)      Subjective   History of Present Illness  The patient presents for evaluation of diabetes, indigestion, pruritus, knee pain, and hypercholesterolemia.    He reports no current health issues but requires a refill of his omeprazole prescription, which he finds highly effective in managing his indigestion. He has 1 tablet left.    He has a history of severe neuropathy in both legs, which he attributes to a past episode of hyperglycemia that resulted in hospitalization at [facility] approximately 3 years ago. During this incident, his blood glucose levels peaked at 1500, leading to a loss of consciousness and a 2-day hospital stay. He also experienced nerve damage during this period. He has been fasting today, consuming only water, and reports no chest pain or shortness of breath.    He has been experiencing recurrent pruritic papules, predominantly on his legs, which he tends to scratch. He has not tried any topical treatments for his condition. He has previously sought treatment from Dr. Olmstead, who prescribed oral steroids, but these have not

## 2025-02-20 DIAGNOSIS — I15.9 SECONDARY HYPERTENSION: ICD-10-CM

## 2025-02-20 DIAGNOSIS — M25.562 ACUTE PAIN OF LEFT KNEE: Primary | ICD-10-CM

## 2025-02-20 DIAGNOSIS — M25.561 ACUTE PAIN OF RIGHT KNEE: ICD-10-CM

## 2025-02-20 DIAGNOSIS — M19.90 ARTHRITIS: Primary | ICD-10-CM

## 2025-02-20 DIAGNOSIS — E78.5 HYPERLIPIDEMIA, UNSPECIFIED HYPERLIPIDEMIA TYPE: ICD-10-CM

## 2025-02-20 DIAGNOSIS — E11.42 TYPE 2 DIABETES MELLITUS WITH DIABETIC POLYNEUROPATHY, WITHOUT LONG-TERM CURRENT USE OF INSULIN (HCC): ICD-10-CM

## 2025-02-20 LAB
ALBUMIN SERPL-MCNC: 3.5 G/DL (ref 3.5–5)
ALBUMIN/GLOB SERPL: 1 (ref 1.1–2.2)
ALP SERPL-CCNC: 108 U/L (ref 45–117)
ALT SERPL-CCNC: 12 U/L (ref 12–78)
ANION GAP SERPL CALC-SCNC: 9 MMOL/L (ref 2–12)
AST SERPL-CCNC: 10 U/L (ref 15–37)
BILIRUB SERPL-MCNC: 0.6 MG/DL (ref 0.2–1)
BUN SERPL-MCNC: 25 MG/DL (ref 6–20)
BUN/CREAT SERPL: 24 (ref 12–20)
CALCIUM SERPL-MCNC: 9.1 MG/DL (ref 8.5–10.1)
CHLORIDE SERPL-SCNC: 101 MMOL/L (ref 97–108)
CHOLEST SERPL-MCNC: 177 MG/DL
CO2 SERPL-SCNC: 27 MMOL/L (ref 21–32)
CREAT SERPL-MCNC: 1.04 MG/DL (ref 0.7–1.3)
CREAT UR-MCNC: 145 MG/DL
ERYTHROCYTE [DISTWIDTH] IN BLOOD BY AUTOMATED COUNT: 13.9 % (ref 11.5–14.5)
EST. AVERAGE GLUCOSE BLD GHB EST-MCNC: 186 MG/DL
GLOBULIN SER CALC-MCNC: 3.4 G/DL (ref 2–4)
GLUCOSE SERPL-MCNC: 137 MG/DL (ref 65–100)
HBA1C MFR BLD: 8.1 % (ref 4–5.6)
HCT VFR BLD AUTO: 44 % (ref 36.6–50.3)
HDLC SERPL-MCNC: 37 MG/DL
HDLC SERPL: 4.8 (ref 0–5)
HGB BLD-MCNC: 13.5 G/DL (ref 12.1–17)
LDLC SERPL CALC-MCNC: 99.2 MG/DL (ref 0–100)
MCH RBC QN AUTO: 25.8 PG (ref 26–34)
MCHC RBC AUTO-ENTMCNC: 30.7 G/DL (ref 30–36.5)
MCV RBC AUTO: 84 FL (ref 80–99)
MICROALBUMIN UR-MCNC: 0.72 MG/DL
MICROALBUMIN/CREAT UR-RTO: 5 MG/G (ref 0–30)
NRBC # BLD: 0 K/UL (ref 0–0.01)
NRBC BLD-RTO: 0 PER 100 WBC
PLATELET # BLD AUTO: 259 K/UL (ref 150–400)
PMV BLD AUTO: 10.3 FL (ref 8.9–12.9)
POTASSIUM SERPL-SCNC: 4.4 MMOL/L (ref 3.5–5.1)
PROT SERPL-MCNC: 6.9 G/DL (ref 6.4–8.2)
RBC # BLD AUTO: 5.24 M/UL (ref 4.1–5.7)
SODIUM SERPL-SCNC: 137 MMOL/L (ref 136–145)
TRIGL SERPL-MCNC: 204 MG/DL
TSH SERPL DL<=0.05 MIU/L-ACNC: 1.53 UIU/ML (ref 0.36–3.74)
VLDLC SERPL CALC-MCNC: 40.8 MG/DL
WBC # BLD AUTO: 4.3 K/UL (ref 4.1–11.1)

## 2025-02-20 NOTE — PROGRESS NOTES
Called and spoke to the patient and he agreed to start Rybelsus over the injectable versions.  He just did not want to use any needles.  Went ahead and started him on diclofenac for his knee advised again of the potential risk to include GI bleeding and cardiovascular risk.  In is them that I put in an x-ray for his knees but that he would possibly need to also follow-up with an orthopedic specialist.

## 2025-02-20 NOTE — RESULT ENCOUNTER NOTE
Notify patient via [employer] message    Your cholesterol panel was slightly elevated for your goal.  Would ideally like to get your LDL under 55.    Breanne Monroy Guess,    Attached are the results of your hemoglobin A1C test. As you know, this is a 3-month measurement of your blood glucose levels. This test is a much more accurate picture of your long-term sugar control, as compared to a spot glucose check. Your number was 8.1, which is higher than what we want. Our goal is to always be below 7, or as close to 7 as we can get. At this point, I recommend GLP. If you agree with this plan, please let me know and I will send in a prescription to your pharmacy. We will check on this again during our next routine follow-up.    Your metabolic panel which looks at your blood sugar, electrolytes, liver function, and kidney function looks good.  But did show some signs of potential dehydration recommend you maintain hydration through adequate water intake.    Your CBC which looks at your white blood cells, red blood cells, and platelets came back looking normal. No sign of infection or anemia.       No elevation of protein in the urine, as shown by the microalbumin/cr ratio.  This is good.  We can prevent kidney disease and its progression by making sure that your blood sugars and blood pressure are well controlled    Your TSH which screens for thyroid disease came back normal. This means you likely do not have hyper (high) or hypo (low) functioning thyroid.         Sincerely,  Dinh

## 2025-02-21 ENCOUNTER — TELEPHONE (OUTPATIENT)
Facility: CLINIC | Age: 70
End: 2025-02-21

## 2025-02-21 DIAGNOSIS — E78.5 HYPERLIPIDEMIA, UNSPECIFIED HYPERLIPIDEMIA TYPE: ICD-10-CM

## 2025-02-21 DIAGNOSIS — I15.9 SECONDARY HYPERTENSION: ICD-10-CM

## 2025-02-21 DIAGNOSIS — E11.42 TYPE 2 DIABETES MELLITUS WITH DIABETIC POLYNEUROPATHY, WITHOUT LONG-TERM CURRENT USE OF INSULIN (HCC): ICD-10-CM

## 2025-02-22 ENCOUNTER — HOSPITAL ENCOUNTER (INPATIENT)
Facility: HOSPITAL | Age: 70
LOS: 3 days | Discharge: HOME | DRG: 321 | End: 2025-02-25
Attending: EMERGENCY MEDICINE | Admitting: INTERNAL MEDICINE
Payer: MEDICARE

## 2025-02-22 ENCOUNTER — APPOINTMENT (OUTPATIENT)
Facility: HOSPITAL | Age: 70
DRG: 321 | End: 2025-02-22
Payer: MEDICARE

## 2025-02-22 ENCOUNTER — APPOINTMENT (OUTPATIENT)
Facility: HOSPITAL | Age: 70
DRG: 321 | End: 2025-02-22
Attending: INTERNAL MEDICINE
Payer: MEDICARE

## 2025-02-22 DIAGNOSIS — I21.9 ACUTE MYOCARDIAL INFARCTION, UNSPECIFIED MI TYPE, UNSPECIFIED ARTERY (HCC): ICD-10-CM

## 2025-02-22 DIAGNOSIS — I21.11 ST ELEVATION MYOCARDIAL INFARCTION INVOLVING RIGHT CORONARY ARTERY (HCC): ICD-10-CM

## 2025-02-22 DIAGNOSIS — E11.65 TYPE 2 DIABETES MELLITUS WITH HYPERGLYCEMIA, WITHOUT LONG-TERM CURRENT USE OF INSULIN (HCC): ICD-10-CM

## 2025-02-22 DIAGNOSIS — I21.3 STEMI (ST ELEVATION MYOCARDIAL INFARCTION) (HCC): ICD-10-CM

## 2025-02-22 DIAGNOSIS — I21.19 ACUTE ST ELEVATION MYOCARDIAL INFARCTION (STEMI) INVOLVING OTHER CORONARY ARTERY OF INFERIOR WALL (HCC): Primary | ICD-10-CM

## 2025-02-22 LAB
ANION GAP SERPL CALC-SCNC: 7 MMOL/L (ref 2–12)
APTT PPP: 22.8 SEC (ref 22.1–31)
BASOPHILS # BLD: 0.03 K/UL (ref 0–0.1)
BASOPHILS NFR BLD: 0.4 % (ref 0–1)
BUN SERPL-MCNC: 23 MG/DL (ref 6–20)
BUN/CREAT SERPL: 22 (ref 12–20)
CALCIUM SERPL-MCNC: 8.2 MG/DL (ref 8.5–10.1)
CHLORIDE SERPL-SCNC: 110 MMOL/L (ref 97–108)
CO2 SERPL-SCNC: 24 MMOL/L (ref 21–32)
COMMENT:: NORMAL
CREAT SERPL-MCNC: 1.04 MG/DL (ref 0.7–1.3)
DIFFERENTIAL METHOD BLD: NORMAL
ECHO AO ARCH DIAM: 1.6 CM
ECHO AO ASC DIAM: 3.6 CM
ECHO AO ASCENDING AORTA INDEX: 1.59 CM/M2
ECHO AO ROOT DIAM: 3.7 CM
ECHO AO ROOT INDEX: 1.64 CM/M2
ECHO AR MAX VEL PISA: 1.5 M/S
ECHO AV AREA PEAK VELOCITY: 3.2 CM2
ECHO AV AREA PEAK VELOCITY: 3.7 CM2
ECHO AV AREA VTI: 3.3 CM2
ECHO AV AREA/BSA VTI: 1.5 CM2/M2
ECHO AV MEAN GRADIENT: 2 MMHG
ECHO AV MEAN VELOCITY: 0.7 M/S
ECHO AV PEAK GRADIENT: 3 MMHG
ECHO AV PEAK GRADIENT: 4 MMHG
ECHO AV PEAK VELOCITY: 0.9 M/S
ECHO AV PEAK VELOCITY: 1 M/S
ECHO AV REGURGITANT PHT: 482.5 MS
ECHO AV VTI: 19.9 CM
ECHO BSA: 2.3 M2
ECHO BSA: 2.3 M2
ECHO LA DIAMETER INDEX: 1.55 CM/M2
ECHO LA DIAMETER: 3.5 CM
ECHO LA TO AORTIC ROOT RATIO: 0.95
ECHO LA VOL A-L A2C: 29 ML (ref 18–58)
ECHO LA VOL A-L A4C: 27 ML (ref 18–58)
ECHO LA VOL BP: 26 ML (ref 18–58)
ECHO LA VOL MOD A2C: 26 ML (ref 18–58)
ECHO LA VOL MOD A4C: 25 ML (ref 18–58)
ECHO LA VOL/BSA BIPLANE: 12 ML/M2 (ref 16–34)
ECHO LA VOLUME AREA LENGTH: 28 ML
ECHO LA VOLUME INDEX A-L A2C: 13 ML/M2 (ref 16–34)
ECHO LA VOLUME INDEX A-L A4C: 12 ML/M2 (ref 16–34)
ECHO LA VOLUME INDEX AREA LENGTH: 12 ML/M2 (ref 16–34)
ECHO LA VOLUME INDEX MOD A2C: 12 ML/M2 (ref 16–34)
ECHO LA VOLUME INDEX MOD A4C: 11 ML/M2 (ref 16–34)
ECHO LV E' LATERAL VELOCITY: 6.98 CM/S
ECHO LV E' SEPTAL VELOCITY: 4.52 CM/S
ECHO LV EDV A2C: 106 ML
ECHO LV EDV A4C: 98 ML
ECHO LV EDV BP: 103 ML (ref 67–155)
ECHO LV EDV INDEX A4C: 43 ML/M2
ECHO LV EDV INDEX BP: 46 ML/M2
ECHO LV EDV NDEX A2C: 47 ML/M2
ECHO LV EF PHYSICIAN: 30 %
ECHO LV EJECTION FRACTION A2C: 39 %
ECHO LV EJECTION FRACTION A4C: 43 %
ECHO LV ESV A2C: 65 ML
ECHO LV ESV A4C: 55 ML
ECHO LV ESV BP: 59 ML (ref 22–58)
ECHO LV ESV INDEX A2C: 29 ML/M2
ECHO LV ESV INDEX A4C: 24 ML/M2
ECHO LV ESV INDEX BP: 26 ML/M2
ECHO LV FRACTIONAL SHORTENING: 21 % (ref 28–44)
ECHO LV INTERNAL DIMENSION DIASTOLE INDEX: 2.35 CM/M2
ECHO LV INTERNAL DIMENSION DIASTOLIC: 5.3 CM (ref 4.2–5.9)
ECHO LV INTERNAL DIMENSION SYSTOLIC INDEX: 1.86 CM/M2
ECHO LV INTERNAL DIMENSION SYSTOLIC: 4.2 CM
ECHO LV IVSD: 0.7 CM (ref 0.6–1)
ECHO LV MASS 2D: 127 G (ref 88–224)
ECHO LV MASS INDEX 2D: 56.2 G/M2 (ref 49–115)
ECHO LV POSTERIOR WALL DIASTOLIC: 0.7 CM (ref 0.6–1)
ECHO LV RELATIVE WALL THICKNESS RATIO: 0.26
ECHO LVOT AREA: 4.9 CM2
ECHO LVOT AV VTI INDEX: 0.67
ECHO LVOT DIAM: 2.5 CM
ECHO LVOT MEAN GRADIENT: 1 MMHG
ECHO LVOT PEAK GRADIENT: 2 MMHG
ECHO LVOT PEAK VELOCITY: 0.7 M/S
ECHO LVOT STROKE VOLUME INDEX: 28.9 ML/M2
ECHO LVOT SV: 65.3 ML
ECHO LVOT VTI: 13.3 CM
ECHO MV A VELOCITY: 0.84 M/S
ECHO MV E DECELERATION TIME (DT): 199.7 MS
ECHO MV E VELOCITY: 0.44 M/S
ECHO MV E/A RATIO: 0.52
ECHO MV E/E' LATERAL: 6.3
ECHO MV E/E' RATIO (AVERAGED): 8.02
ECHO MV E/E' SEPTAL: 9.73
ECHO MV REGURGITANT PEAK GRADIENT: 61 MMHG
ECHO MV REGURGITANT PEAK VELOCITY: 3.9 M/S
ECHO PV MAX VELOCITY: 0.9 M/S
ECHO PV PEAK GRADIENT: 3 MMHG
ECHO RV FREE WALL PEAK S': 7 CM/S
ECHO RV INTERNAL DIMENSION: 4.3 CM
ECHO RV TAPSE: 1.6 CM (ref 1.7–?)
ECHO TV REGURGITANT MAX VELOCITY: 2.13 M/S
ECHO TV REGURGITANT PEAK GRADIENT: 18 MMHG
EOSINOPHIL # BLD: 0.36 K/UL (ref 0–0.4)
EOSINOPHIL NFR BLD: 5.2 % (ref 0–7)
ERYTHROCYTE [DISTWIDTH] IN BLOOD BY AUTOMATED COUNT: 13.9 % (ref 11.5–14.5)
GLUCOSE BLD STRIP.AUTO-MCNC: 138 MG/DL (ref 65–117)
GLUCOSE BLD STRIP.AUTO-MCNC: 183 MG/DL (ref 65–117)
GLUCOSE BLD STRIP.AUTO-MCNC: 226 MG/DL (ref 65–117)
GLUCOSE BLD STRIP.AUTO-MCNC: 247 MG/DL (ref 65–117)
GLUCOSE SERPL-MCNC: 226 MG/DL (ref 65–100)
HCT VFR BLD AUTO: 38.4 % (ref 36.6–50.3)
HGB BLD-MCNC: 12.3 G/DL (ref 12.1–17)
IMM GRANULOCYTES # BLD AUTO: 0.03 K/UL (ref 0–0.04)
IMM GRANULOCYTES NFR BLD AUTO: 0.4 % (ref 0–0.5)
INR PPP: 1.1 (ref 0.9–1.1)
LYMPHOCYTES # BLD: 1.96 K/UL (ref 0.8–3.5)
LYMPHOCYTES NFR BLD: 28.3 % (ref 12–49)
MAGNESIUM SERPL-MCNC: 1.7 MG/DL (ref 1.6–2.4)
MCH RBC QN AUTO: 26.4 PG (ref 26–34)
MCHC RBC AUTO-ENTMCNC: 32 G/DL (ref 30–36.5)
MCV RBC AUTO: 82.4 FL (ref 80–99)
MONOCYTES # BLD: 0.64 K/UL (ref 0–1)
MONOCYTES NFR BLD: 9.2 % (ref 5–13)
NEUTS SEG # BLD: 3.9 K/UL (ref 1.8–8)
NEUTS SEG NFR BLD: 56.5 % (ref 32–75)
NRBC # BLD: 0 K/UL (ref 0–0.01)
NRBC BLD-RTO: 0 PER 100 WBC
PLATELET # BLD AUTO: 290 K/UL (ref 150–400)
PMV BLD AUTO: 10.1 FL (ref 8.9–12.9)
POTASSIUM SERPL-SCNC: 3.5 MMOL/L (ref 3.5–5.1)
PROTHROMBIN TIME: 11 SEC (ref 9.2–11.2)
RBC # BLD AUTO: 4.66 M/UL (ref 4.1–5.7)
SERVICE CMNT-IMP: ABNORMAL
SODIUM SERPL-SCNC: 141 MMOL/L (ref 136–145)
SPECIMEN HOLD: NORMAL
THERAPEUTIC RANGE: NORMAL SECS (ref 58–77)
TROPONIN I SERPL HS-MCNC: 72 NG/L (ref 0–76)
WBC # BLD AUTO: 6.9 K/UL (ref 4.1–11.1)

## 2025-02-22 PROCEDURE — 85730 THROMBOPLASTIN TIME PARTIAL: CPT

## 2025-02-22 PROCEDURE — 6360000002 HC RX W HCPCS: Performed by: EMERGENCY MEDICINE

## 2025-02-22 PROCEDURE — 84484 ASSAY OF TROPONIN QUANT: CPT

## 2025-02-22 PROCEDURE — C9601 PERC DRUG-EL COR STENT BRAN: HCPCS | Performed by: INTERNAL MEDICINE

## 2025-02-22 PROCEDURE — 93306 TTE W/DOPPLER COMPLETE: CPT

## 2025-02-22 PROCEDURE — 6360000004 HC RX CONTRAST MEDICATION: Performed by: INTERNAL MEDICINE

## 2025-02-22 PROCEDURE — 6370000000 HC RX 637 (ALT 250 FOR IP): Performed by: INTERNAL MEDICINE

## 2025-02-22 PROCEDURE — 4A023N7 MEASUREMENT OF CARDIAC SAMPLING AND PRESSURE, LEFT HEART, PERCUTANEOUS APPROACH: ICD-10-PCS | Performed by: INTERNAL MEDICINE

## 2025-02-22 PROCEDURE — C9600 PERC DRUG-EL COR STENT SING: HCPCS | Performed by: INTERNAL MEDICINE

## 2025-02-22 PROCEDURE — B240ZZ3 ULTRASONOGRAPHY OF SINGLE CORONARY ARTERY, INTRAVASCULAR: ICD-10-PCS | Performed by: INTERNAL MEDICINE

## 2025-02-22 PROCEDURE — 96372 THER/PROPH/DIAG INJ SC/IM: CPT

## 2025-02-22 PROCEDURE — 92978 ENDOLUMINL IVUS OCT C 1ST: CPT | Performed by: INTERNAL MEDICINE

## 2025-02-22 PROCEDURE — 76937 US GUIDE VASCULAR ACCESS: CPT | Performed by: INTERNAL MEDICINE

## 2025-02-22 PROCEDURE — 71045 X-RAY EXAM CHEST 1 VIEW: CPT

## 2025-02-22 PROCEDURE — 2709999900 HC NON-CHARGEABLE SUPPLY: Performed by: INTERNAL MEDICINE

## 2025-02-22 PROCEDURE — 85610 PROTHROMBIN TIME: CPT

## 2025-02-22 PROCEDURE — 02C03ZZ EXTIRPATION OF MATTER FROM CORONARY ARTERY, ONE ARTERY, PERCUTANEOUS APPROACH: ICD-10-PCS | Performed by: INTERNAL MEDICINE

## 2025-02-22 PROCEDURE — 92975: CPT | Performed by: INTERNAL MEDICINE

## 2025-02-22 PROCEDURE — 99291 CRITICAL CARE FIRST HOUR: CPT

## 2025-02-22 PROCEDURE — 6360000002 HC RX W HCPCS: Performed by: INTERNAL MEDICINE

## 2025-02-22 PROCEDURE — 2580000003 HC RX 258: Performed by: STUDENT IN AN ORGANIZED HEALTH CARE EDUCATION/TRAINING PROGRAM

## 2025-02-22 PROCEDURE — C1887 CATHETER, GUIDING: HCPCS | Performed by: INTERNAL MEDICINE

## 2025-02-22 PROCEDURE — C1769 GUIDE WIRE: HCPCS | Performed by: INTERNAL MEDICINE

## 2025-02-22 PROCEDURE — 93458 L HRT ARTERY/VENTRICLE ANGIO: CPT | Performed by: INTERNAL MEDICINE

## 2025-02-22 PROCEDURE — C1753 CATH, INTRAVAS ULTRASOUND: HCPCS | Performed by: INTERNAL MEDICINE

## 2025-02-22 PROCEDURE — 2580000003 HC RX 258: Performed by: EMERGENCY MEDICINE

## 2025-02-22 PROCEDURE — 0270356 DILATION OF CORONARY ARTERY, ONE ARTERY, BIFURCATION, WITH TWO DRUG-ELUTING INTRALUMINAL DEVICES, PERCUTANEOUS APPROACH: ICD-10-PCS | Performed by: INTERNAL MEDICINE

## 2025-02-22 PROCEDURE — 99291 CRITICAL CARE FIRST HOUR: CPT | Performed by: INTERNAL MEDICINE

## 2025-02-22 PROCEDURE — 6360000002 HC RX W HCPCS

## 2025-02-22 PROCEDURE — 92973 PRQ TRLUML C MCHN ASP THRMBC: CPT | Performed by: INTERNAL MEDICINE

## 2025-02-22 PROCEDURE — 6370000000 HC RX 637 (ALT 250 FOR IP)

## 2025-02-22 PROCEDURE — 93306 TTE W/DOPPLER COMPLETE: CPT | Performed by: INTERNAL MEDICINE

## 2025-02-22 PROCEDURE — 92941 PRQ TRLML REVSC TOT OCCL AMI: CPT | Performed by: INTERNAL MEDICINE

## 2025-02-22 PROCEDURE — 83735 ASSAY OF MAGNESIUM: CPT

## 2025-02-22 PROCEDURE — 80048 BASIC METABOLIC PNL TOTAL CA: CPT

## 2025-02-22 PROCEDURE — 94761 N-INVAS EAR/PLS OXIMETRY MLT: CPT

## 2025-02-22 PROCEDURE — C1874 STENT, COATED/COV W/DEL SYS: HCPCS | Performed by: INTERNAL MEDICINE

## 2025-02-22 PROCEDURE — 85025 COMPLETE CBC W/AUTO DIFF WBC: CPT

## 2025-02-22 PROCEDURE — B2111ZZ FLUOROSCOPY OF MULTIPLE CORONARY ARTERIES USING LOW OSMOLAR CONTRAST: ICD-10-PCS | Performed by: INTERNAL MEDICINE

## 2025-02-22 PROCEDURE — 6370000000 HC RX 637 (ALT 250 FOR IP): Performed by: EMERGENCY MEDICINE

## 2025-02-22 PROCEDURE — C1725 CATH, TRANSLUMIN NON-LASER: HCPCS | Performed by: INTERNAL MEDICINE

## 2025-02-22 PROCEDURE — 36415 COLL VENOUS BLD VENIPUNCTURE: CPT

## 2025-02-22 PROCEDURE — 2580000003 HC RX 258

## 2025-02-22 PROCEDURE — 2580000003 HC RX 258: Performed by: INTERNAL MEDICINE

## 2025-02-22 PROCEDURE — 2500000003 HC RX 250 WO HCPCS: Performed by: INTERNAL MEDICINE

## 2025-02-22 PROCEDURE — C1894 INTRO/SHEATH, NON-LASER: HCPCS | Performed by: INTERNAL MEDICINE

## 2025-02-22 PROCEDURE — 6370000000 HC RX 637 (ALT 250 FOR IP): Performed by: HOSPITALIST

## 2025-02-22 PROCEDURE — 96374 THER/PROPH/DIAG INJ IV PUSH: CPT

## 2025-02-22 PROCEDURE — 2000000000 HC ICU R&B

## 2025-02-22 PROCEDURE — 85347 COAGULATION TIME ACTIVATED: CPT | Performed by: INTERNAL MEDICINE

## 2025-02-22 PROCEDURE — 85347 COAGULATION TIME ACTIVATED: CPT

## 2025-02-22 PROCEDURE — 2720000010 HC SURG SUPPLY STERILE: Performed by: INTERNAL MEDICINE

## 2025-02-22 PROCEDURE — 2500000003 HC RX 250 WO HCPCS: Performed by: STUDENT IN AN ORGANIZED HEALTH CARE EDUCATION/TRAINING PROGRAM

## 2025-02-22 PROCEDURE — 82962 GLUCOSE BLOOD TEST: CPT

## 2025-02-22 DEVICE — STENT ONYXNG30012UX ONYX 3.00X12RX
Type: IMPLANTABLE DEVICE | Status: FUNCTIONAL
Brand: ONYX FRONTIER™

## 2025-02-22 DEVICE — STENT ONYXNG40026UX ONYX 4.00X26RX
Type: IMPLANTABLE DEVICE | Status: FUNCTIONAL
Brand: ONYX FRONTIER™

## 2025-02-22 RX ORDER — PHENYLEPHRINE HYDROCHLORIDE 10 MG/ML
INJECTION INTRAVENOUS PRN
Status: DISCONTINUED | OUTPATIENT
Start: 2025-02-22 | End: 2025-02-22 | Stop reason: HOSPADM

## 2025-02-22 RX ORDER — SODIUM NITROPRUSSIDE 25 MG/ML
INJECTION INTRAVENOUS PRN
Status: DISCONTINUED | OUTPATIENT
Start: 2025-02-22 | End: 2025-02-22 | Stop reason: HOSPADM

## 2025-02-22 RX ORDER — SODIUM CHLORIDE 9 MG/ML
INJECTION, SOLUTION INTRAVENOUS CONTINUOUS
Status: DISCONTINUED | OUTPATIENT
Start: 2025-02-22 | End: 2025-02-24

## 2025-02-22 RX ORDER — VERAPAMIL HYDROCHLORIDE 2.5 MG/ML
INJECTION, SOLUTION INTRAVENOUS PRN
Status: DISCONTINUED | OUTPATIENT
Start: 2025-02-22 | End: 2025-02-22 | Stop reason: HOSPADM

## 2025-02-22 RX ORDER — LIDOCAINE HYDROCHLORIDE 10 MG/ML
INJECTION, SOLUTION INFILTRATION; PERINEURAL PRN
Status: DISCONTINUED | OUTPATIENT
Start: 2025-02-22 | End: 2025-02-22 | Stop reason: HOSPADM

## 2025-02-22 RX ORDER — MORPHINE SULFATE 4 MG/ML
4 INJECTION, SOLUTION INTRAMUSCULAR; INTRAVENOUS
Status: COMPLETED | OUTPATIENT
Start: 2025-02-22 | End: 2025-02-22

## 2025-02-22 RX ORDER — HEPARIN SODIUM 1000 [USP'U]/ML
INJECTION, SOLUTION INTRAVENOUS; SUBCUTANEOUS PRN
Status: DISCONTINUED | OUTPATIENT
Start: 2025-02-22 | End: 2025-02-22 | Stop reason: HOSPADM

## 2025-02-22 RX ORDER — SODIUM CHLORIDE 0.9 % (FLUSH) 0.9 %
5-40 SYRINGE (ML) INJECTION EVERY 12 HOURS SCHEDULED
Status: DISCONTINUED | OUTPATIENT
Start: 2025-02-22 | End: 2025-02-25 | Stop reason: HOSPADM

## 2025-02-22 RX ORDER — INSULIN GLARGINE 100 [IU]/ML
0.1 INJECTION, SOLUTION SUBCUTANEOUS DAILY
Status: DISCONTINUED | OUTPATIENT
Start: 2025-02-22 | End: 2025-02-24

## 2025-02-22 RX ORDER — DEXTROSE MONOHYDRATE 100 MG/ML
INJECTION, SOLUTION INTRAVENOUS CONTINUOUS PRN
Status: DISCONTINUED | OUTPATIENT
Start: 2025-02-22 | End: 2025-02-25 | Stop reason: HOSPADM

## 2025-02-22 RX ORDER — HEPARIN SODIUM 5000 [USP'U]/ML
4000 INJECTION, SOLUTION INTRAVENOUS; SUBCUTANEOUS ONCE
Status: COMPLETED | OUTPATIENT
Start: 2025-02-22 | End: 2025-02-22

## 2025-02-22 RX ORDER — PRASUGREL 5 MG/1
TABLET, FILM COATED ORAL PRN
Status: DISCONTINUED | OUTPATIENT
Start: 2025-02-22 | End: 2025-02-22 | Stop reason: HOSPADM

## 2025-02-22 RX ORDER — SODIUM CHLORIDE 9 MG/ML
INJECTION, SOLUTION INTRAVENOUS PRN
Status: DISCONTINUED | OUTPATIENT
Start: 2025-02-22 | End: 2025-02-25 | Stop reason: HOSPADM

## 2025-02-22 RX ORDER — ASPIRIN 81 MG/1
81 TABLET ORAL DAILY
Status: DISCONTINUED | OUTPATIENT
Start: 2025-02-22 | End: 2025-02-25 | Stop reason: HOSPADM

## 2025-02-22 RX ORDER — ACETAMINOPHEN 325 MG/1
650 TABLET ORAL EVERY 4 HOURS PRN
Status: DISCONTINUED | OUTPATIENT
Start: 2025-02-22 | End: 2025-02-25 | Stop reason: HOSPADM

## 2025-02-22 RX ORDER — 0.9 % SODIUM CHLORIDE 0.9 %
INTRAVENOUS SOLUTION INTRAVENOUS CONTINUOUS PRN
Status: DISCONTINUED | OUTPATIENT
Start: 2025-02-22 | End: 2025-02-22 | Stop reason: HOSPADM

## 2025-02-22 RX ORDER — INSULIN LISPRO 100 [IU]/ML
0-4 INJECTION, SOLUTION INTRAVENOUS; SUBCUTANEOUS
Status: DISCONTINUED | OUTPATIENT
Start: 2025-02-22 | End: 2025-02-25 | Stop reason: HOSPADM

## 2025-02-22 RX ORDER — EPTIFIBATIDE 0.75 MG/ML
INJECTION, SOLUTION INTRAVENOUS CONTINUOUS PRN
Status: COMPLETED | OUTPATIENT
Start: 2025-02-22 | End: 2025-02-22

## 2025-02-22 RX ORDER — 0.9 % SODIUM CHLORIDE 0.9 %
1000 INTRAVENOUS SOLUTION INTRAVENOUS ONCE
Status: COMPLETED | OUTPATIENT
Start: 2025-02-22 | End: 2025-02-22

## 2025-02-22 RX ORDER — 0.9 % SODIUM CHLORIDE 0.9 %
INTRAVENOUS SOLUTION INTRAVENOUS CONTINUOUS PRN
Status: COMPLETED | OUTPATIENT
Start: 2025-02-22 | End: 2025-02-22

## 2025-02-22 RX ORDER — PRASUGREL 10 MG/1
10 TABLET, FILM COATED ORAL DAILY
Status: DISCONTINUED | OUTPATIENT
Start: 2025-02-23 | End: 2025-02-25 | Stop reason: HOSPADM

## 2025-02-22 RX ORDER — LISINOPRIL 5 MG/1
5 TABLET ORAL DAILY
Status: DISCONTINUED | OUTPATIENT
Start: 2025-02-22 | End: 2025-02-23

## 2025-02-22 RX ORDER — NITROGLYCERIN 0.4 MG/1
0.4 TABLET SUBLINGUAL EVERY 5 MIN PRN
Status: DISCONTINUED | OUTPATIENT
Start: 2025-02-22 | End: 2025-02-25 | Stop reason: HOSPADM

## 2025-02-22 RX ORDER — SODIUM CHLORIDE 0.9 % (FLUSH) 0.9 %
5-40 SYRINGE (ML) INJECTION PRN
Status: DISCONTINUED | OUTPATIENT
Start: 2025-02-22 | End: 2025-02-25 | Stop reason: HOSPADM

## 2025-02-22 RX ORDER — IOPAMIDOL 755 MG/ML
INJECTION, SOLUTION INTRAVASCULAR PRN
Status: DISCONTINUED | OUTPATIENT
Start: 2025-02-22 | End: 2025-02-22 | Stop reason: HOSPADM

## 2025-02-22 RX ORDER — GLIPIZIDE 5 MG/1
5 TABLET ORAL 2 TIMES DAILY WITH MEALS
Status: DISCONTINUED | OUTPATIENT
Start: 2025-02-22 | End: 2025-02-25 | Stop reason: HOSPADM

## 2025-02-22 RX ORDER — METOPROLOL SUCCINATE 50 MG/1
50 TABLET, EXTENDED RELEASE ORAL DAILY
Status: DISCONTINUED | OUTPATIENT
Start: 2025-02-23 | End: 2025-02-23

## 2025-02-22 RX ORDER — ROSUVASTATIN CALCIUM 10 MG/1
20 TABLET, COATED ORAL DAILY
Status: DISCONTINUED | OUTPATIENT
Start: 2025-02-22 | End: 2025-02-25 | Stop reason: HOSPADM

## 2025-02-22 RX ADMIN — SODIUM CHLORIDE: 9 INJECTION, SOLUTION INTRAVENOUS at 07:52

## 2025-02-22 RX ADMIN — MORPHINE SULFATE 4 MG: 4 INJECTION INTRAVENOUS at 04:08

## 2025-02-22 RX ADMIN — INSULIN LISPRO 1 UNITS: 100 INJECTION, SOLUTION INTRAVENOUS; SUBCUTANEOUS at 13:16

## 2025-02-22 RX ADMIN — INSULIN LISPRO 1 UNITS: 100 INJECTION, SOLUTION INTRAVENOUS; SUBCUTANEOUS at 16:33

## 2025-02-22 RX ADMIN — INSULIN GLARGINE 10 UNITS: 100 INJECTION, SOLUTION SUBCUTANEOUS at 13:21

## 2025-02-22 RX ADMIN — ROSUVASTATIN CALCIUM 20 MG: 10 TABLET, FILM COATED ORAL at 08:02

## 2025-02-22 RX ADMIN — NITROGLYCERIN 0.4 MG: 0.4 TABLET SUBLINGUAL at 03:59

## 2025-02-22 RX ADMIN — SODIUM CHLORIDE, PRESERVATIVE FREE 10 ML: 5 INJECTION INTRAVENOUS at 20:58

## 2025-02-22 RX ADMIN — SODIUM CHLORIDE, PRESERVATIVE FREE 10 ML: 5 INJECTION INTRAVENOUS at 09:24

## 2025-02-22 RX ADMIN — SODIUM CHLORIDE, PRESERVATIVE FREE 10 ML: 5 INJECTION INTRAVENOUS at 08:05

## 2025-02-22 RX ADMIN — NITROGLYCERIN 0.4 MG: 0.4 TABLET SUBLINGUAL at 03:52

## 2025-02-22 RX ADMIN — GLIPIZIDE 5 MG: 5 TABLET ORAL at 08:02

## 2025-02-22 RX ADMIN — ASPIRIN 81 MG: 81 TABLET, COATED ORAL at 08:02

## 2025-02-22 RX ADMIN — SODIUM CHLORIDE 40 MG: 9 INJECTION INTRAMUSCULAR; INTRAVENOUS; SUBCUTANEOUS at 15:23

## 2025-02-22 RX ADMIN — LISINOPRIL 5 MG: 5 TABLET ORAL at 08:02

## 2025-02-22 RX ADMIN — SODIUM CHLORIDE 1000 ML: 9 INJECTION, SOLUTION INTRAVENOUS at 04:08

## 2025-02-22 RX ADMIN — HEPARIN SODIUM 4000 UNITS: 5000 INJECTION INTRAVENOUS; SUBCUTANEOUS at 03:52

## 2025-02-22 RX ADMIN — Medication 6 MG: at 19:43

## 2025-02-22 ASSESSMENT — PAIN - FUNCTIONAL ASSESSMENT: PAIN_FUNCTIONAL_ASSESSMENT: 0-10

## 2025-02-22 ASSESSMENT — PAIN SCALES - GENERAL
PAINLEVEL_OUTOF10: 7
PAINLEVEL_OUTOF10: 6
PAINLEVEL_OUTOF10: 7

## 2025-02-22 ASSESSMENT — PAIN DESCRIPTION - LOCATION: LOCATION: CHEST

## 2025-02-22 ASSESSMENT — HEART SCORE: ECG: SIGNIFICANT ST-DEVIATION

## 2025-02-22 NOTE — H&P
[facility] CARDIOLOGY  Cardiology Care Note                  [x]Initial visit     []Established visit     Patient Name: Adrian Garay - :1955 - MRN:[id number]  Primary Cardiologist: None  Consulting Cardiologist: Rishi Mcadams MD     Reason for initial visit: Chest pain    HPI:     Patient with history of coronary artery disease, prior PCI to LAD, diabetes, hypertension presents to the hospital with 2 hours of chest discomfort which was persistent in the form of indigestion in precordial region.  He has been having off-and-on indigestion symptoms for the past 2 weeks.  He saw his cardiologist about a week ago but at that time it was unclear whether his symptoms were anginal or not.  Symptoms are typically go away after leaning forward but this morning did not resolve spontaneously and he called squad to be brought in.  ECG was consistent with ST elevation inferior leads.    Remote history of acute anterior myocardial infarction treated with bare-metal stent placement to mid LAD.  Subsequently had pseudoaneurysm at the femoral arteriotomy site.  That was managed with thrombin injection.  Follows up with Dr. Lawrence    SUBJECTIVE: Appears uncomfortable.       Assessment and Plan     1.  Acute inferior STEMI  2.  Coronary disease prior PCI to LAD  3.  Diabetes  4.  Hypertension  5.  Hyperlipidemia    Patient presents to the hospital with acute inferior ST elevation myocardial infarction.  He will be taken urgently to cardiac catheterization lab to perform coronary angiogram and revascularization as needed.  Will address risk factors including hypertension, hyperlipidemia, diabetes during this hospitalization as well.    I discussed the risks/benefits/alternatives of the procedure with the patient. Risks include (but are not limited to) bleeding, infection,stroke,heart attack, need for emergency surgery/pericardiocentesis, need for

## 2025-02-22 NOTE — ED PROVIDER NOTES
[facility] CARDIAC CATH LAB/EP/IR LAB  EMERGENCY DEPARTMENT ENCOUNTER      Pt Name: Adrian Garay  MRN: [id number]  Birthdate 1955  Date of evaluation: 2/22/2025  Provider: Manuel Flood MD    CHIEF COMPLAINT       Chief Complaint   Patient presents with    Chest Pain         HISTORY OF PRESENT ILLNESS   (Location/Symptom, Timing/Onset, Context/Setting, Quality, Duration, Modifying Factors, Severity)  Note limiting factors.   HPI  HISTORY OF PRESENT ILLNESS    Esequiel Garay is a 69-year-old male with a past medical history significant for a myocardial infarction 8 years ago, hypertension, hyperlipidemia, diabetes mellitus, and ischemic cardiomyopathy. He arrived via EMS from home, with the history provided by both the patient and EMS. He presents with chest pain located at the center of the chest, which began at 0215 this morning while he was resting. The patient denies any similarity of this pain to his previous myocardial infarction and also denies associated symptoms such as trouble breathing or nausea. He took 324 mg of aspirin at home without relief.    HEALTHCARE PROVIDERS    Cardiologist: Dr. Weems    PAST MEDICAL HISTORY    - Myocardial infarction 8 years ago    - Hypertension (HTN)    - Hyperlipidemia (HLD)    - Diabetes Mellitus (DM)    - Ischemic cardiomyopathy    SOCIAL HISTORY    - Denies smoking    - Denies drug use (cocaine or methamphetamine)    - Denies use of medications for erectile dysfunction like Cialis or Viagra    Nursing Notes were reviewed.    PAST MEDICAL HISTORY     Past Medical History:   Diagnosis Date    CAD (coronary artery disease)     STEMI 7/28/12    DDD (degenerative disc disease)     DM type 2 (diabetes mellitus, type 2) ([facility])     HLD (hyperlipidemia)     HTN (hypertension)     MI (myocardial infarction) ([facility]) 7/2012    acute anterior STEMI felt to have occurred 36 hours PTA 7/28/12    Pseudoaneurysm     thrombin injection 7/12         SURGICAL HISTORY

## 2025-02-22 NOTE — CONSULTS
Consult Note     Name: Adrian Garay MRN: [id number]  Sex: male    YOB: 1955  Age: 69 y.o.  PCP: Zeenat Franks MD     Date of admission:    2/22/2025  Date of consultation:   2/22/2025  Requesting physician:   Dr. Rishi Mcadams  Reason for consultation:   Inpatient diabetes management     History of present illness  Adrian Garay is a 69 y.o. male pmhx DM2, CAD s/p PCI to LAD, HTN, HLD, GERD  who presented to ED with chest discomfort after having symptoms of indigestion for the the past 2 weeks. He was found to have a STEMI and was taken for an emergent cath -- he is now s/p 2 stents to his RCA.    As for his diabetes, reports it has been uncontrolled recently with his most recent A1c being 8.1. He is on metformin, glipizide, and actos at home. His PCP recently prescribed Rybelsus, but it was not covered by his insurance. He has never been on insulin. He does have diabetic neuropathy of his bilateral LE. Checks his feet regularly and gets annual eye exams.     He is c/o low appetite and mild nausea. He is asking for a sleep aid.       Past Medical History   Past Medical History:   Diagnosis Date    CAD (coronary artery disease)     STEMI 7/28/12    DDD (degenerative disc disease)     DM type 2 (diabetes mellitus, type 2) ([facility])     HLD (hyperlipidemia)     HTN (hypertension)     MI (myocardial infarction) ([facility]) 7/2012    acute anterior STEMI felt to have occurred 36 hours PTA 7/28/12    Pseudoaneurysm     thrombin injection 7/12       Home Medications  Prior to Admission Medications   Prescriptions Last Dose Informant Patient Reported? Taking?   Semaglutide 3 MG TABS   No No   Sig: Take 3 mg by mouth daily   aspirin 81 MG EC tablet   Yes No   Sig: Take 1 tablet by mouth daily   atorvastatin (LIPITOR) 40 MG tablet   No No   Sig: Take 1 tablet by mouth daily   Patient not taking: Reported on 2/19/2025   diclofenac sodium (VOLTAREN) 1 % GEL   No No   Sig: Apply 4 g topically 4 times daily

## 2025-02-22 NOTE — ED TRIAGE NOTES
Patient arrived via POV with complaints of chest pain starting at 0215 this morning.  Took aspirin with no relief.  EMS should ST elevation in leads 1, 2, and AVF.    Prior hx of heart attack 8 years ago.

## 2025-02-23 LAB
ANION GAP SERPL CALC-SCNC: 5 MMOL/L (ref 2–12)
BUN SERPL-MCNC: 17 MG/DL (ref 6–20)
BUN/CREAT SERPL: 17 (ref 12–20)
CALCIUM SERPL-MCNC: 8.1 MG/DL (ref 8.5–10.1)
CHLORIDE SERPL-SCNC: 109 MMOL/L (ref 97–108)
CHOLEST SERPL-MCNC: 124 MG/DL
CO2 SERPL-SCNC: 26 MMOL/L (ref 21–32)
CREAT SERPL-MCNC: 0.98 MG/DL (ref 0.7–1.3)
ERYTHROCYTE [DISTWIDTH] IN BLOOD BY AUTOMATED COUNT: 14.1 % (ref 11.5–14.5)
EST. AVERAGE GLUCOSE BLD GHB EST-MCNC: 174 MG/DL
GLUCOSE BLD STRIP.AUTO-MCNC: 137 MG/DL (ref 65–117)
GLUCOSE BLD STRIP.AUTO-MCNC: 180 MG/DL (ref 65–117)
GLUCOSE BLD STRIP.AUTO-MCNC: 229 MG/DL (ref 65–117)
GLUCOSE BLD STRIP.AUTO-MCNC: 230 MG/DL (ref 65–117)
GLUCOSE SERPL-MCNC: 128 MG/DL (ref 65–100)
HBA1C MFR BLD: 7.7 % (ref 4–5.6)
HCT VFR BLD AUTO: 36.5 % (ref 36.6–50.3)
HDLC SERPL-MCNC: 35 MG/DL
HDLC SERPL: 3.5 (ref 0–5)
HGB BLD-MCNC: 11.6 G/DL (ref 12.1–17)
LDLC SERPL CALC-MCNC: 66.4 MG/DL (ref 0–100)
MCH RBC QN AUTO: 26.8 PG (ref 26–34)
MCHC RBC AUTO-ENTMCNC: 31.8 G/DL (ref 30–36.5)
MCV RBC AUTO: 84.3 FL (ref 80–99)
NRBC # BLD: 0 K/UL (ref 0–0.01)
NRBC BLD-RTO: 0 PER 100 WBC
PLATELET # BLD AUTO: 199 K/UL (ref 150–400)
PMV BLD AUTO: 9.8 FL (ref 8.9–12.9)
POTASSIUM SERPL-SCNC: 3.5 MMOL/L (ref 3.5–5.1)
RBC # BLD AUTO: 4.33 M/UL (ref 4.1–5.7)
SERVICE CMNT-IMP: ABNORMAL
SODIUM SERPL-SCNC: 140 MMOL/L (ref 136–145)
T4 FREE SERPL-MCNC: 1.2 NG/DL (ref 0.8–1.5)
TRIGL SERPL-MCNC: 113 MG/DL
TSH SERPL DL<=0.05 MIU/L-ACNC: 0.74 UIU/ML (ref 0.36–3.74)
VLDLC SERPL CALC-MCNC: 22.6 MG/DL
WBC # BLD AUTO: 7.8 K/UL (ref 4.1–11.1)

## 2025-02-23 PROCEDURE — 94761 N-INVAS EAR/PLS OXIMETRY MLT: CPT

## 2025-02-23 PROCEDURE — 84443 ASSAY THYROID STIM HORMONE: CPT

## 2025-02-23 PROCEDURE — 83036 HEMOGLOBIN GLYCOSYLATED A1C: CPT

## 2025-02-23 PROCEDURE — 99233 SBSQ HOSP IP/OBS HIGH 50: CPT | Performed by: INTERNAL MEDICINE

## 2025-02-23 PROCEDURE — 85027 COMPLETE CBC AUTOMATED: CPT

## 2025-02-23 PROCEDURE — 2500000003 HC RX 250 WO HCPCS: Performed by: INTERNAL MEDICINE

## 2025-02-23 PROCEDURE — 84439 ASSAY OF FREE THYROXINE: CPT

## 2025-02-23 PROCEDURE — 6370000000 HC RX 637 (ALT 250 FOR IP): Performed by: HOSPITALIST

## 2025-02-23 PROCEDURE — 80061 LIPID PANEL: CPT

## 2025-02-23 PROCEDURE — 6370000000 HC RX 637 (ALT 250 FOR IP): Performed by: INTERNAL MEDICINE

## 2025-02-23 PROCEDURE — 2060000000 HC ICU INTERMEDIATE R&B

## 2025-02-23 PROCEDURE — 36415 COLL VENOUS BLD VENIPUNCTURE: CPT

## 2025-02-23 PROCEDURE — 2580000003 HC RX 258

## 2025-02-23 PROCEDURE — 80048 BASIC METABOLIC PNL TOTAL CA: CPT

## 2025-02-23 PROCEDURE — 6370000000 HC RX 637 (ALT 250 FOR IP)

## 2025-02-23 PROCEDURE — 99232 SBSQ HOSP IP/OBS MODERATE 35: CPT | Performed by: FAMILY MEDICINE

## 2025-02-23 PROCEDURE — 82962 GLUCOSE BLOOD TEST: CPT

## 2025-02-23 PROCEDURE — 6360000002 HC RX W HCPCS

## 2025-02-23 RX ORDER — CARVEDILOL 3.12 MG/1
3.12 TABLET ORAL 2 TIMES DAILY WITH MEALS
Status: DISCONTINUED | OUTPATIENT
Start: 2025-02-23 | End: 2025-02-24

## 2025-02-23 RX ADMIN — INSULIN GLARGINE 10 UNITS: 100 INJECTION, SOLUTION SUBCUTANEOUS at 08:58

## 2025-02-23 RX ADMIN — INSULIN LISPRO 1 UNITS: 100 INJECTION, SOLUTION INTRAVENOUS; SUBCUTANEOUS at 16:22

## 2025-02-23 RX ADMIN — ROSUVASTATIN CALCIUM 20 MG: 10 TABLET, FILM COATED ORAL at 08:57

## 2025-02-23 RX ADMIN — PRASUGREL 10 MG: 10 TABLET, FILM COATED ORAL at 08:57

## 2025-02-23 RX ADMIN — ASPIRIN 81 MG: 81 TABLET, COATED ORAL at 08:57

## 2025-02-23 RX ADMIN — CARVEDILOL 3.12 MG: 3.12 TABLET, FILM COATED ORAL at 16:21

## 2025-02-23 RX ADMIN — INSULIN LISPRO 1 UNITS: 100 INJECTION, SOLUTION INTRAVENOUS; SUBCUTANEOUS at 21:23

## 2025-02-23 RX ADMIN — SODIUM CHLORIDE 40 MG: 9 INJECTION INTRAMUSCULAR; INTRAVENOUS; SUBCUTANEOUS at 09:01

## 2025-02-23 RX ADMIN — SODIUM CHLORIDE, PRESERVATIVE FREE 10 ML: 5 INJECTION INTRAVENOUS at 09:13

## 2025-02-23 RX ADMIN — INSULIN LISPRO 1 UNITS: 100 INJECTION, SOLUTION INTRAVENOUS; SUBCUTANEOUS at 12:01

## 2025-02-23 ASSESSMENT — PAIN SCALES - GENERAL: PAINLEVEL_OUTOF10: 0

## 2025-02-23 NOTE — CONSULTS
Consult Note     Name: Adrian Garay MRN: [id number]  Sex: male    YOB: 1955  Age: 69 y.o.  PCP: Zeenat Franks MD     Date of admission:    2/22/2025  Date of consultation:   2/23/2025  Requesting physician:   Dr. Rishi Mcadams  Reason for consultation:   Inpatient diabetes management     History of present illness  Adrian Garay is a 69 y.o. male pmhx DM2, CAD s/p PCI to LAD, HTN, HLD, GERD  who presented to ED with chest discomfort after having symptoms of indigestion for the the past 2 weeks. He was found to have a STEMI and was taken for an emergent cath -- he is now s/p 2 stents to his RCA.    As for his diabetes, reports it has been uncontrolled recently with his most recent A1c being 8.1. He is on metformin, glipizide, and actos at home. His PCP recently prescribed Rybelsus, but it was not covered by his insurance. He has never been on insulin. He does have diabetic neuropathy of his bilateral LE. Checks his feet regularly and gets annual eye exams.     He is c/o low appetite and mild nausea. He is asking for a sleep aid.       Past Medical History   Past Medical History:   Diagnosis Date    CAD (coronary artery disease)     STEMI 7/28/12    DDD (degenerative disc disease)     DM type 2 (diabetes mellitus, type 2) ([facility])     HLD (hyperlipidemia)     HTN (hypertension)     MI (myocardial infarction) ([facility]) 7/2012    acute anterior STEMI felt to have occurred 36 hours PTA 7/28/12    Pseudoaneurysm     thrombin injection 7/12       Home Medications  Prior to Admission Medications   Prescriptions Last Dose Informant Patient Reported? Taking?   Semaglutide 3 MG TABS   No No   Sig: Take 3 mg by mouth daily   aspirin 81 MG EC tablet   Yes No   Sig: Take 1 tablet by mouth daily   atorvastatin (LIPITOR) 40 MG tablet   No No   Sig: Take 1 tablet by mouth daily   Patient not taking: Reported on 2/19/2025   diclofenac sodium (VOLTAREN) 1 % GEL   No No   Sig: Apply 4 g topically 4 times daily

## 2025-02-23 NOTE — PLAN OF CARE
Problem: Pain  Goal: Verbalizes/displays adequate comfort level or baseline comfort level  Outcome: Progressing  Flowsheets (Taken 2/23/2025 0800)  Verbalizes/displays adequate comfort level or baseline comfort level:   Encourage patient to monitor pain and request assistance   Assess pain using appropriate pain scale   Administer analgesics based on type and severity of pain and evaluate response   Implement non-pharmacological measures as appropriate and evaluate response     Problem: Safety - Adult  Goal: Free from fall injury  Outcome: Progressing     Problem: Chronic Conditions and Co-morbidities  Goal: Patient's chronic conditions and co-morbidity symptoms are monitored and maintained or improved  Outcome: Progressing  Flowsheets  Taken 2/23/2025 0800 by Manuela Benson RN  Care Plan - Patient's Chronic Conditions and Co-Morbidity Symptoms are Monitored and Maintained or Improved:   Monitor and assess patient's chronic conditions and comorbid symptoms for stability, deterioration, or improvement   Collaborate with multidisciplinary team to address chronic and comorbid conditions and prevent exacerbation or deterioration   Update acute care plan with appropriate goals if chronic or comorbid symptoms are exacerbated and prevent overall improvement and discharge  Taken 2/23/2025 0400 by Adilson Gaming RN  Care Plan - Patient's Chronic Conditions and Co-Morbidity Symptoms are Monitored and Maintained or Improved:   Monitor and assess patient's chronic conditions and comorbid symptoms for stability, deterioration, or improvement   Collaborate with multidisciplinary team to address chronic and comorbid conditions and prevent exacerbation or deterioration  Taken 2/23/2025 0000 by Adilson Gaming RN  Care Plan - Patient's Chronic Conditions and Co-Morbidity Symptoms are Monitored and Maintained or Improved:   Monitor and assess patient's chronic conditions and comorbid symptoms for stability, deterioration, or

## 2025-02-24 DIAGNOSIS — Z95.5 STENTED CORONARY ARTERY: Primary | ICD-10-CM

## 2025-02-24 DIAGNOSIS — I21.11 ST ELEVATION MYOCARDIAL INFARCTION INVOLVING RIGHT CORONARY ARTERY (HCC): ICD-10-CM

## 2025-02-24 LAB
ACT BLD: 187 SECS (ref 79–138)
ACT BLD: 216 SECS (ref 79–138)
ACT BLD: 256 SECS (ref 79–138)
ACT BLD: 366 SECS (ref 79–138)
GLUCOSE BLD STRIP.AUTO-MCNC: 168 MG/DL (ref 65–117)
GLUCOSE BLD STRIP.AUTO-MCNC: 187 MG/DL (ref 65–117)
GLUCOSE BLD STRIP.AUTO-MCNC: 204 MG/DL (ref 65–117)
GLUCOSE BLD STRIP.AUTO-MCNC: 251 MG/DL (ref 65–117)
SERVICE CMNT-IMP: ABNORMAL

## 2025-02-24 PROCEDURE — 99232 SBSQ HOSP IP/OBS MODERATE 35: CPT | Performed by: FAMILY MEDICINE

## 2025-02-24 PROCEDURE — 94761 N-INVAS EAR/PLS OXIMETRY MLT: CPT

## 2025-02-24 PROCEDURE — 2060000000 HC ICU INTERMEDIATE R&B

## 2025-02-24 PROCEDURE — 6370000000 HC RX 637 (ALT 250 FOR IP): Performed by: INTERNAL MEDICINE

## 2025-02-24 PROCEDURE — 6370000000 HC RX 637 (ALT 250 FOR IP): Performed by: STUDENT IN AN ORGANIZED HEALTH CARE EDUCATION/TRAINING PROGRAM

## 2025-02-24 PROCEDURE — 99233 SBSQ HOSP IP/OBS HIGH 50: CPT | Performed by: STUDENT IN AN ORGANIZED HEALTH CARE EDUCATION/TRAINING PROGRAM

## 2025-02-24 PROCEDURE — 2580000003 HC RX 258

## 2025-02-24 PROCEDURE — 82962 GLUCOSE BLOOD TEST: CPT

## 2025-02-24 PROCEDURE — 2500000003 HC RX 250 WO HCPCS: Performed by: INTERNAL MEDICINE

## 2025-02-24 PROCEDURE — 6360000002 HC RX W HCPCS

## 2025-02-24 PROCEDURE — 6370000000 HC RX 637 (ALT 250 FOR IP)

## 2025-02-24 PROCEDURE — 6370000000 HC RX 637 (ALT 250 FOR IP): Performed by: HOSPITALIST

## 2025-02-24 RX ORDER — SPIRONOLACTONE 25 MG/1
12.5 TABLET ORAL DAILY
Status: DISCONTINUED | OUTPATIENT
Start: 2025-02-24 | End: 2025-02-25 | Stop reason: HOSPADM

## 2025-02-24 RX ORDER — INSULIN GLARGINE 100 [IU]/ML
12 INJECTION, SOLUTION SUBCUTANEOUS DAILY
Status: DISCONTINUED | OUTPATIENT
Start: 2025-02-24 | End: 2025-02-25

## 2025-02-24 RX ORDER — LOSARTAN POTASSIUM 25 MG/1
12.5 TABLET ORAL DAILY
Status: DISCONTINUED | OUTPATIENT
Start: 2025-02-24 | End: 2025-02-25 | Stop reason: HOSPADM

## 2025-02-24 RX ADMIN — ROSUVASTATIN CALCIUM 20 MG: 10 TABLET, FILM COATED ORAL at 09:18

## 2025-02-24 RX ADMIN — SODIUM CHLORIDE, PRESERVATIVE FREE 10 ML: 5 INJECTION INTRAVENOUS at 10:35

## 2025-02-24 RX ADMIN — SODIUM CHLORIDE 40 MG: 9 INJECTION INTRAMUSCULAR; INTRAVENOUS; SUBCUTANEOUS at 09:19

## 2025-02-24 RX ADMIN — ASPIRIN 81 MG: 81 TABLET, COATED ORAL at 09:18

## 2025-02-24 RX ADMIN — INSULIN LISPRO 2 UNITS: 100 INJECTION, SOLUTION INTRAVENOUS; SUBCUTANEOUS at 13:07

## 2025-02-24 RX ADMIN — INSULIN GLARGINE 12 UNITS: 100 INJECTION, SOLUTION SUBCUTANEOUS at 09:18

## 2025-02-24 RX ADMIN — INSULIN LISPRO 1 UNITS: 100 INJECTION, SOLUTION INTRAVENOUS; SUBCUTANEOUS at 19:10

## 2025-02-24 RX ADMIN — SODIUM CHLORIDE, PRESERVATIVE FREE 10 ML: 5 INJECTION INTRAVENOUS at 20:41

## 2025-02-24 RX ADMIN — SPIRONOLACTONE 12.5 MG: 25 TABLET ORAL at 19:11

## 2025-02-24 RX ADMIN — LOSARTAN POTASSIUM 12.5 MG: 25 TABLET, FILM COATED ORAL at 19:11

## 2025-02-24 RX ADMIN — PRASUGREL 10 MG: 10 TABLET, FILM COATED ORAL at 10:36

## 2025-02-24 ASSESSMENT — PAIN SCALES - GENERAL: PAINLEVEL_OUTOF10: 0

## 2025-02-24 NOTE — DISCHARGE INSTRUCTIONS
2-4 weeks after hospital discharge.  This is a medically supervised physical therapy program in addition to diet/lifestyle education. It's designed to improve heart recovery and your ability to function.  It also prepares you for activities of daily living.  We do not recommend you do any activity that hasn't been attempted in cardiac rehab.  If questions, contact [facility]:  [phone]  Manage stress in your home and work life to ease strain on your heart.    If you feel well and are rested, you can have sexual activity.            FOLLOW UP  Your will be set up to see your cardiologist in 2-4 weeks after hospital discharge.  Call 911 immediately if you have chest pain, pain to your shoulder, neck or back or new shortness of breath.  Do not drive yourself to the hospital.   Call your healthcare provider right away for symptoms of lightheadedness, dizziness, fainting, feeling of irregular heartbeat or fast pulse.     Questions:  Contact your Cardiology team via [employer] or Phone: [phone]  [facility]  [address]  [address]  [place] [address]

## 2025-02-24 NOTE — PLAN OF CARE
Problem: Safety - Adult  Goal: Free from fall injury  2/23/2025 2351 by Nicolette Lehman, RN  Outcome: Progressing  2/23/2025 1237 by Manuela Benson, RN  Outcome: Progressing

## 2025-02-24 NOTE — CARDIO/PULMONARY
[facility]- Referral  Chart review completed.  Noted: Inferior STEMI, DARRELL RCA, HFrEF - EF 30% - order for LifeVest, DM2, HTN, HLD.   Met with patient at bedside.   STEMI/PCI /HFrEF patient meets criteria for outpatient cardiac rehab.  [facility] outpatient cardiac rehab referral will be initiated.    Educational handouts reviewed and provided on: STEMI/ PCI procedure, coronary artery disease, coronary artery risk factors, heart healthy eating, and community resources.    The format and benefits of enrolling in a cardiac rehab Phase II program were communicated.   All questions answered.  Reference information on [facility] also provided.  [facility] cardiac rehab staff will contact patIent, per telephone call, to assess and schedule program participation.  Carley Franklin RN

## 2025-02-24 NOTE — CARE COORDINATION
2/24/2025  3:24 PM  Cardiology has ordered a Life Vest. [employer] is processing, pending insurance approval and fitting  JAVIER Cohen      11:56 AM     02/24/25 1153   Service Assessment   Patient Orientation Alert and Oriented   Cognition Alert   History Provided By Patient   Primary Caregiver Self   Support Systems Children   Patient's Healthcare Decision Maker is: Legal Next of Kin   PCP Verified by CM Yes  (Zeenat Franks MD , [facility])   Last Visit to PCP Within last 3 months   Prior Functional Level Independent in ADLs/IADLs   Can patient return to prior living arrangement Yes   Discharge Planning   Current Services Prior To Admission None   Potential Assistance Purchasing Medications No  ([employer], [place], no barriers to access)   Patient expects to be discharged to: House   History of falls? 0   Services At/After Discharge   Mode of Transport at Discharge Other (see comment)  (Family or friend)   Confirm Follow Up Transport Self     2/24/2025  11:57 AM      ICD-10-CM    1. Acute ST elevation myocardial infarction (STEMI) involving other coronary artery of inferior wall (HCC)  I21.19       2. STEMI (ST elevation myocardial infarction) (HCC)  I21.3 Cardiac procedure     Cardiac procedure      3. Acute myocardial infarction, unspecified MI type, unspecified artery (HCC)  I21.9 Echo (TTE) complete (PRN contrast/bubble/strain/3D)     Echo (TTE) complete (PRN contrast/bubble/strain/3D)      4. ST elevation myocardial infarction involving right coronary artery (HCC)  I21.11           General Risk Score: 3   Values used to calculate this score:    Points  Metrics       1        Age: 69       1        Hospital Admissions: 1       0        ED Visits: 0       0        Has Chronic Obstructive Pulmonary Disease: No       1        Has Diabetes: Yes       0        Has Congestive Heart Failure: No       0        Has Liver Disease: No       0        Has Depression: No

## 2025-02-24 NOTE — CARE COORDINATION
3:28 PM  02/24/25    Pt to discharge home with Life Vest per cardiology.    CM printed order and clinicals for [employer] LifeVest rep Haile- he will process with pt's insurance and coordinate fitting with pt.    CM will follow as needed.    URVASHI Trinidad

## 2025-02-25 VITALS
BODY MASS INDEX: 30.07 KG/M2 | HEIGHT: 72 IN | HEART RATE: 92 BPM | DIASTOLIC BLOOD PRESSURE: 72 MMHG | WEIGHT: 222 LBS | TEMPERATURE: 97.9 F | OXYGEN SATURATION: 99 % | SYSTOLIC BLOOD PRESSURE: 107 MMHG | RESPIRATION RATE: 17 BRPM

## 2025-02-25 LAB
GLUCOSE BLD STRIP.AUTO-MCNC: 176 MG/DL (ref 65–117)
GLUCOSE BLD STRIP.AUTO-MCNC: 259 MG/DL (ref 65–117)
SERVICE CMNT-IMP: ABNORMAL
SERVICE CMNT-IMP: ABNORMAL

## 2025-02-25 PROCEDURE — 6370000000 HC RX 637 (ALT 250 FOR IP): Performed by: HOSPITALIST

## 2025-02-25 PROCEDURE — 6370000000 HC RX 637 (ALT 250 FOR IP)

## 2025-02-25 PROCEDURE — 82962 GLUCOSE BLOOD TEST: CPT

## 2025-02-25 PROCEDURE — 97116 GAIT TRAINING THERAPY: CPT | Performed by: PHYSICAL THERAPIST

## 2025-02-25 PROCEDURE — 6360000002 HC RX W HCPCS

## 2025-02-25 PROCEDURE — 2580000003 HC RX 258

## 2025-02-25 PROCEDURE — 94761 N-INVAS EAR/PLS OXIMETRY MLT: CPT

## 2025-02-25 PROCEDURE — 6370000000 HC RX 637 (ALT 250 FOR IP): Performed by: INTERNAL MEDICINE

## 2025-02-25 PROCEDURE — 97161 PT EVAL LOW COMPLEX 20 MIN: CPT | Performed by: PHYSICAL THERAPIST

## 2025-02-25 PROCEDURE — 2500000003 HC RX 250 WO HCPCS: Performed by: INTERNAL MEDICINE

## 2025-02-25 PROCEDURE — 6370000000 HC RX 637 (ALT 250 FOR IP): Performed by: STUDENT IN AN ORGANIZED HEALTH CARE EDUCATION/TRAINING PROGRAM

## 2025-02-25 RX ORDER — ASPIRIN 81 MG/1
81 TABLET ORAL DAILY
Qty: 30 TABLET | Refills: 3 | Status: SHIPPED | OUTPATIENT
Start: 2025-02-25

## 2025-02-25 RX ORDER — SPIRONOLACTONE 25 MG/1
12.5 TABLET ORAL DAILY
Qty: 30 TABLET | Refills: 3 | Status: SHIPPED | OUTPATIENT
Start: 2025-02-25

## 2025-02-25 RX ORDER — PANTOPRAZOLE SODIUM 40 MG/1
40 TABLET, DELAYED RELEASE ORAL
Status: DISCONTINUED | OUTPATIENT
Start: 2025-02-26 | End: 2025-02-25 | Stop reason: HOSPADM

## 2025-02-25 RX ORDER — CLOPIDOGREL BISULFATE 75 MG/1
75 TABLET ORAL DAILY
Qty: 30 TABLET | Refills: 3 | Status: SHIPPED | OUTPATIENT
Start: 2025-02-25

## 2025-02-25 RX ORDER — LOSARTAN POTASSIUM 25 MG/1
12.5 TABLET ORAL DAILY
Qty: 30 TABLET | Refills: 3 | Status: SHIPPED | OUTPATIENT
Start: 2025-02-25

## 2025-02-25 RX ORDER — METOPROLOL SUCCINATE 25 MG/1
12.5 TABLET, EXTENDED RELEASE ORAL DAILY
Status: DISCONTINUED | OUTPATIENT
Start: 2025-02-25 | End: 2025-02-25 | Stop reason: HOSPADM

## 2025-02-25 RX ORDER — METOPROLOL SUCCINATE 25 MG/1
12.5 TABLET, EXTENDED RELEASE ORAL DAILY
Qty: 30 TABLET | Refills: 3 | Status: SHIPPED | OUTPATIENT
Start: 2025-02-25

## 2025-02-25 RX ORDER — INSULIN GLARGINE 100 [IU]/ML
14 INJECTION, SOLUTION SUBCUTANEOUS DAILY
Status: DISCONTINUED | OUTPATIENT
Start: 2025-02-25 | End: 2025-02-25 | Stop reason: HOSPADM

## 2025-02-25 RX ADMIN — INSULIN GLARGINE 14 UNITS: 100 INJECTION, SOLUTION SUBCUTANEOUS at 09:56

## 2025-02-25 RX ADMIN — ASPIRIN 81 MG: 81 TABLET, COATED ORAL at 09:53

## 2025-02-25 RX ADMIN — SPIRONOLACTONE 12.5 MG: 25 TABLET ORAL at 09:52

## 2025-02-25 RX ADMIN — ROSUVASTATIN CALCIUM 20 MG: 10 TABLET, FILM COATED ORAL at 09:54

## 2025-02-25 RX ADMIN — METOPROLOL SUCCINATE 12.5 MG: 25 TABLET, EXTENDED RELEASE ORAL at 09:53

## 2025-02-25 RX ADMIN — LOSARTAN POTASSIUM 12.5 MG: 25 TABLET, FILM COATED ORAL at 09:53

## 2025-02-25 RX ADMIN — SODIUM CHLORIDE, PRESERVATIVE FREE 10 ML: 5 INJECTION INTRAVENOUS at 09:58

## 2025-02-25 RX ADMIN — PRASUGREL 10 MG: 10 TABLET, FILM COATED ORAL at 10:12

## 2025-02-25 RX ADMIN — SODIUM CHLORIDE 40 MG: 9 INJECTION INTRAMUSCULAR; INTRAVENOUS; SUBCUTANEOUS at 09:58

## 2025-02-25 RX ADMIN — INSULIN LISPRO 2 UNITS: 100 INJECTION, SOLUTION INTRAVENOUS; SUBCUTANEOUS at 12:14

## 2025-02-25 ASSESSMENT — PAIN SCALES - GENERAL: PAINLEVEL_OUTOF10: 0

## 2025-02-25 NOTE — DISCHARGE SUMMARY
distal vessels.  Final transfer decent.  Finally proximal RCA lesion was treated with 4 x 26 DARRELL and right PL lesion was treated by 3 x 12 DARRELL right at the bifurcation postdilated with 3.5 noncompliant.  Proximal RCA lesion had heavy plaque burden on intravascular ultrasound and to avoid slow flow, aggressive postdilatation was not performed.  Stents were intentionally undersized for the vessel due to heavy plaque burden both in right posterolateral as well as proximal RCA.  4.  Total occlusion of previously placed mid LAD stent with diffuse faint visible collaterals and distal vasculature.  Likely LAD small since RCA is a large vessel with the apex of the heart supplied by RCA  5.  Diffuse disease in circumflex, proximal LAD as well as proximal diagonal branches and total occlusion of proximal marginal branch.    Access: Right radial.  Proximal aorta is tortuous.  However procedure was completed by radial approach  Contrast 80 cc    Recommendations  1.  Prasugrel based dual endplate therapy  2.  Conservative management for rest of the coronary disease given diffuse plaque burden and relatively poor outflow  3.  Aggressive GDMT for secondary prevention of coronary events and progressing underlying risk factors.    Signed by: Rishi Mcadams MD on 2/22/2025  6:47 AM      Discharge Exam:   /72   Pulse 92   Temp 97.9 °F (36.6 °C) (Oral)   Resp 17   Ht 1.829 m (6')   Wt 100.7 kg (222 lb)   SpO2 99%   BMI 30.11 kg/m²     Disposition: home    Patient Instructions:      Medication List        START taking these medications      clopidogrel 75 MG tablet  Commonly known as: Plavix  Take 1 tablet by mouth daily     losartan 25 MG tablet  Commonly known as: COZAAR  Take 0.5 tablets by mouth daily     spironolactone 25 MG tablet  Commonly known as: ALDACTONE  Take 0.5 tablets by mouth daily            CHANGE how you take these medications      aspirin 81 MG EC tablet  Take 1 tablet by mouth daily May also purchase

## 2025-02-25 NOTE — CARE COORDINATION
Care Management Discharge Note:      02/25/25 1139   Discharge Planning   Patient expects to be discharged to: House   Services At/After Discharge   Transition of Care Consult (CM Consult) Discharge Planning   Services At/After Discharge Outpatient;DME  (Life Vest with [employer])   Mode of Transport at Discharge Other (see comment)  (family)   Confirm Follow Up Transport Self     Patient with dc orders. [facility] faxed orders for Life Vest to [employer] this am to [phone].  Patient to dc home after fitted for Life Vest. Family will transport at time of dc.   ______________________  Asha SOSA, RN  Care Management  2/25/2025

## 2025-02-25 NOTE — PROGRESS NOTES
[facility]ARD[facility] CARDIOLOGY  Cardiology Care Note                  []Initial visit     [x]Established visit     Patient Name: Adrian Garay - :1955 - MRN:[id number]  Primary Cardiologist: None  Consulting Cardiologist: Bladimir Ojeda DO     Reason for initial visit: Chest pain      SUBJECTIVE:   Resting in bed, denies CP/SOB.  Hasn't been  OOB yet this morning.        Assessment and Plan     1.  STEMI s/p PCI RCA:  on asa/effient.   Pt does not have drug coverage, so will need to change to plavix on d/c.   Cont high intensity statin. Cardiac rehab consult.   2.  Acute HFrEF s/t STEMI:  LV EF 30%, moderate RV dysfunction.   Order lifevest.  GDMT:  hold off on BB d/t BiV dysfunction.  Add ACE/ARB when BP allows.   Further GDMT as outpt.    3.  DM2:  A1c 7.7.   needs improved control.    4.  Hypertension: soft BP today, hold off on additional meds.    5.  Hyperlipidemia: Cont crestor 40mg daily.    LDL goal < 55   Lab Results   Component Value Date    CHOL 124 2025    TRIG 113 2025    HDL 35 2025    LDL 66.4 2025    VLDL 22.6 2025    CHOLHDLRATIO 3.5 2025     Needs to be OOB, walk w/ PT.  Discuss lifevest.         ATTENDING CARDIOLOGIST  The patient was personally examined and chart reviewed. All the elements of history and examination were personally performed and I agree with the plan as listed by advanced practice provider.    Treatment plan was addressed with the patient.      Subjective:  No ongoing chest pain or chest pressure symptoms.  Patient has not ambulated    Blood pressure 114/83, pulse 87, temperature 98.4 °F (36.9 °C), temperature source Oral, resp. rate 14, height 1.829 m (6'), weight 104.4 kg (230 lb 2.6 oz), SpO2 100%.  Normal rate, regular rhythm, S1/S2  Lungs clear      A/P:  RCA STEMI with residual chronic total occlusion of the LAD    Moderate to severe LV 
  Consult Note     Name: Adrian Garay MRN: [id number]  Sex: male    YOB: 1955  Age: 69 y.o.  PCP: Zeenat Franks MD     Date of admission:    2/22/2025  Date of consultation:   2/24/2025  Requesting physician:   Dr. Rishi Mcadams   Reason for consultation:   Inpatient diabetes management     History of present illness  Adrian Garay is a 69 y.o. male pmhx DM2, CAD s/p PCI to LAD, HTN, HLD, GERD  who presented to ED with chest discomfort after having symptoms of indigestion for the the past 2 weeks. He was found to have a STEMI and was taken for an emergent cath -- he is now s/p 2 stents to his RCA.     As for his diabetes, reports it has been uncontrolled recently with his most recent A1c being 8.1. He is on metformin, glipizide, and actos at home. His PCP recently prescribed Rybelsus, but it was not covered by his insurance. He has never been on insulin. He does have diabetic neuropathy of his bilateral LE. Checks his feet regularly and gets annual eye exams.      He is c/o low appetite and mild nausea. He is asking for a sleep aid      Past Medical History   Past Medical History:   Diagnosis Date    CAD (coronary artery disease)     STEMI 7/28/12    DDD (degenerative disc disease)     DM type 2 (diabetes mellitus, type 2) ([facility])     HLD (hyperlipidemia)     HTN (hypertension)     MI (myocardial infarction) ([facility]) 7/2012    acute anterior STEMI felt to have occurred 36 hours PTA 7/28/12    Pseudoaneurysm     thrombin injection 7/12       Home Medications  Prior to Admission Medications   Prescriptions Last Dose Informant Patient Reported? Taking?   Semaglutide 3 MG TABS   No No   Sig: Take 3 mg by mouth daily   aspirin 81 MG EC tablet   Yes No   Sig: Take 1 tablet by mouth daily   atorvastatin (LIPITOR) 40 MG tablet   No No   Sig: Take 1 tablet by mouth daily   Patient not taking: Reported on 2/19/2025   diclofenac sodium (VOLTAREN) 1 % GEL   No No   Sig: Apply 4 g topically 4 times daily 
  Consult Note     Name: Adrian Garay MRN: [id number]  Sex: male    YOB: 1955  Age: 69 y.o.  PCP: Zeenat Franks MD     Date of admission:    2/22/2025  Date of consultation:   2/25/2025  Requesting physician:   Dr. Rishi Mcadams   Reason for consultation:   Inpatient diabetes management     History of present illness  Adrian Garay is a 69 y.o. male pmhx DM2, CAD s/p PCI to LAD, HTN, HLD, GERD  who presented to ED with chest discomfort after having symptoms of indigestion for the the past 2 weeks. He was found to have a STEMI and was taken for an emergent cath -- he is now s/p 2 stents to his RCA.     As for his diabetes, reports it has been uncontrolled recently with his most recent A1c being 8.1. He is on metformin, glipizide, and actos at home. His PCP recently prescribed Rybelsus, but it was not covered by his insurance. He has never been on insulin. He does have diabetic neuropathy of his bilateral LE. Checks his feet regularly and gets annual eye exams.      No major concerns this morning. Appetite is improving, eating full meals.      Past Medical History   Past Medical History:   Diagnosis Date    CAD (coronary artery disease)     STEMI 7/28/12    DDD (degenerative disc disease)     DM type 2 (diabetes mellitus, type 2) ([facility])     HLD (hyperlipidemia)     HTN (hypertension)     MI (myocardial infarction) ([facility]) 7/2012    acute anterior STEMI felt to have occurred 36 hours PTA 7/28/12    Pseudoaneurysm     thrombin injection 7/12       Home Medications  Prior to Admission Medications   Prescriptions Last Dose Informant Patient Reported? Taking?   Semaglutide 3 MG TABS   No No   Sig: Take 3 mg by mouth daily   aspirin 81 MG EC tablet   Yes No   Sig: Take 1 tablet by mouth daily   atorvastatin (LIPITOR) 40 MG tablet   No No   Sig: Take 1 tablet by mouth daily   Patient not taking: Reported on 2/19/2025   diclofenac sodium (VOLTAREN) 1 % GEL   No No   Sig: Apply 4 g topically 4 times daily 
0700:Bedside and Verbal shift change report given to Dayan RN and Marva RN (oncoming nurse) by Kary DEE (offgoing nurse). Report included the following information Nurse Handoff Report.       1406: Reviewed discharge paperwork with patient. Allowed time for questions and clarification of which none were noted. Agreeable to discharge. IV removed.      
0856 TR band with 10mL of air; 2 mL removed.    0915 2mL removed    0930 Slight bruising appearing after 15 minutes; will recheck in 15 more before removing more air    0945 No worsening in site. 2mL removed    1000 2 mL removed    1015 Last 2mL of air removed; will leave band on and reassess in 15 minutes before taking it off completely.     1030 Band removed. Minimal bruising around site with slight swelling but soft to touch; site marked. QuickClot applied and patient educated on limited mobility of extremity and what s/s to watch for when RN is not preset.   
TRANSFER - OUT REPORT:    Verbal report given to LUIZ Acuna on Adrian Garay  being transferred to [facility] for routine progression of patient care       Report consisted of patient's Situation, Background, Assessment and   Recommendations(SBAR).     Information from the following report(s) Nurse Handoff Report was reviewed with the receiving nurse.           Lines:   Peripheral IV 02/22/25 Right;Anterior Forearm (Active)   Site Assessment Clean, dry & intact 02/23/25 1600   Line Status Blood return noted;Capped;Flushed;Normal saline locked 02/23/25 1600   Line Care Connections checked and tightened;Cap changed;Ports disinfected 02/23/25 1600   Phlebitis Assessment No symptoms 02/23/25 1600   Infiltration Assessment 0 02/23/25 1600   Alcohol Cap Used Yes 02/23/25 1600   Dressing Status Clean, dry & intact 02/23/25 1600   Dressing Type Transparent 02/23/25 1600       Peripheral IV 02/22/25 Distal;Left;Anterior Cephalic (Active)   Site Assessment Clean, dry & intact 02/23/25 1600   Line Status Capped;Flushed;Normal saline locked;No blood return 02/23/25 1600   Line Care Connections checked and tightened;Cap changed;Ports disinfected 02/23/25 1600   Phlebitis Assessment No symptoms 02/23/25 1600   Infiltration Assessment 0 02/23/25 1600   Alcohol Cap Used Yes 02/23/25 1600   Dressing Status Clean, dry & intact 02/23/25 1600        Opportunity for questions and clarification was provided.      Patient transported with:  Monitor and Registered Nurse       
PRN, Rishi Mcadams MD, 10 mL at 02/22/25 0805    0.9 % sodium chloride infusion, , IntraVENous, PRN, Rishi Mcadams MD    acetaminophen (TYLENOL) tablet 650 mg, 650 mg, Oral, Q4H PRN, Rishi Mcadams MD    prasugrel (EFFIENT) tablet 10 mg, 10 mg, Oral, Daily, Rishi Mcadams MD, 10 mg at 02/23/25 0857    aspirin EC tablet 81 mg, 81 mg, Oral, Daily, Rishi Mcadams MD, 81 mg at 02/23/25 0857    [Held by provider] glipiZIDE (GLUCOTROL) tablet 5 mg, 5 mg, Oral, BID , Rishi Mcadams MD, 5 mg at 02/22/25 0802    [Held by provider] metFORMIN (GLUCOPHAGE) tablet 500 mg, 500 mg, Oral, BID , Rishi Mcadams MD    rosuvastatin (CRESTOR) tablet 20 mg, 20 mg, Oral, Daily, Rishi Mcadams MD, 20 mg at 02/23/25 0857    glucose chewable tablet 16 g, 4 tablet, Oral, PRN, Rishi Mcadams MD    dextrose bolus 10% 125 mL, 125 mL, IntraVENous, PRN **OR** dextrose bolus 10% 250 mL, 250 mL, IntraVENous, PRN, Rishi Mcadams MD    glucagon injection 1 mg, 1 mg, SubCUTAneous, PRNAbbe Manu, MD    dextrose 10 % infusion, , IntraVENous, Continuous PRN, Rishi Mcadams MD    glucose chewable tablet 16 g, 4 tablet, Oral, PRN, Cuauhtemoc Grajeda MD    dextrose bolus 10% 125 mL, 125 mL, IntraVENous, PRN **OR** dextrose bolus 10% 250 mL, 250 mL, IntraVENous, PRNTaras Amarinder P, MD    glucagon injection 1 mg, 1 mg, SubCUTAneous, PRNTaras Amarinder P, MD    dextrose 10 % infusion, , IntraVENous, Continuous PRNTaras Amarinder P, MD    insulin lispro (HUMALOG,ADMELOG) injection vial 0-4 Units, 0-4 Units, SubCUTAneous, 4x Daily AC & HS, Cuauhtemoc Grajeda MD, 1 Units at 02/22/25 1633    insulin glargine (LANTUS) injection vial 10 Units, 0.1 Units/kg, SubCUTAneous, Daily, [place] MD, 10 Units at 02/23/25 0858    melatonin tablet 6 mg, 6 mg, Oral, Nightly PRN, [place] MD, 6 mg at 02/22/25 1943    pantoprazole (PROTONIX) 40 mg in sodium chloride (PF) 0.9 % 10 mL injection, 40 mg, IntraVENous, Daily, [place] MD, 40 mg 
MD Rishi at [facility] CARDIAC CATH LAB    TUMOR REMOVAL      menigioma     Social Hx:  reports that he quit smoking about 14 years ago. His smoking use included cigarettes. He started smoking about 20 years ago. He has a 0.6 pack-year smoking history. He has never used smokeless tobacco. He reports that he does not drink alcohol and does not use drugs.  Family Hx: family history includes Cancer (age of onset: 59) in his father; Stroke in his mother.  No Known Allergies       OBJECTIVE:  Wt Readings from Last 3 Encounters:   02/25/25 100.7 kg (222 lb)   02/19/25 95.3 kg (210 lb)   07/26/24 96.2 kg (212 lb)     Net IO Since Admission: -760.55 mL [02/25/25 1119]     Physical Exam:    Vitals:   Vitals:    02/25/25 0337 02/25/25 0700 02/25/25 0724 02/25/25 0952   BP: 128/82  112/77 112/77   Pulse: 85 86 79 90   Resp: 16  16    Temp: 98.8 °F (37.1 °C)  98.6 °F (37 °C)    TempSrc: Oral  Oral    SpO2: 95%  95%    Weight: 100.7 kg (222 lb)      Height:         Telemetry:  SR    Gen: Well-developed, well-nourished, in no acute distress  Neck: Supple, No JVD, No Carotid Bruit  Resp: No accessory muscle use, Clear breath sounds, No rales or rhonchi  Card: Regular Rate,Rhythm, Normal S1, S2, No murmurs, rubs or gallop. No thrills.   Abd:   Soft, non-tender, non-distended, BS+   MSK: No cyanosis  Skin: No rashes    Neuro: Moving all four extremities, follows commands appropriately  Psych:  Oriented to person, place, alert, Nml Affect  LE: No edema    Data Review:     Radiology:   XR Results (most recent):  CT Result (most recent):  No results found for this or any previous visit from the past 3650 days.    Xray Result (most recent):  XR CHEST PORTABLE 02/22/2025    Narrative  EXAM:  XR CHEST PORTABLE    INDICATION: STEMI    COMPARISON: none    TECHNIQUE: Semiupright portable chest AP view    FINDINGS: Cardiac monitoring leads are noted. The cardiac silhouette is within  normal limits. The pulmonary vasculature is within normal

## 2025-02-25 NOTE — PLAN OF CARE
Problem: Physical Therapy - Adult  Goal: By Discharge: Performs mobility at highest level of function for planned discharge setting.  See evaluation for individualized goals.  Description: FUNCTIONAL STATUS PRIOR TO ADMISSION: Patient was independent and active without use of DME.      HOME SUPPORT PRIOR TO ADMISSION: The patient lived alone but son lives 2 miles down the road and can assist him if needed.    Physical Therapy Goals  Initiated 2/25/2025  1.  Patient will move from supine to sit and sit to supine in bed with modified independence within 7 day(s).    2.  Patient will perform sit to stand with modified independence within 7 day(s).  3.  Patient will transfer from bed to chair and chair to bed with modified independence using the least restrictive device within 7 day(s).  4.  Patient will ambulate with modified independence for 200 feet with the least restrictive device within 7 day(s).   5.  Patient will ascend/descend 4 stairs with 1 handrail(s) with modified independence within 7 day(s).  Outcome: Progressing   PHYSICAL THERAPY EVALUATION    Patient: Adrian Garay (69 y.o. male)  Date: 2/25/2025  Primary Diagnosis: STEMI (ST elevation myocardial infarction) ([facility]) [I21.3]  Acute ST elevation myocardial infarction (STEMI) involving other coronary artery of inferior wall ([facility]) [I21.19]  Procedure(s) (LRB):  Insert stent jason coronary (N/A)  Left heart cath / coronary angiography (N/A)  Intravascular ultrasound (N/A)  Thrombectomy coronary (N/A)  Ultrasound guided vascular access (N/A)  Thrombolysis coronary (N/A) 3 Days Post-Op   Precautions: Restrictions/Precautions: Fall Risk                      ASSESSMENT :   DEFICITS/IMPAIRMENTS:   The patient is limited by impaired balance, gait instability and decreased activity tolerance.  Overall patient is supervision for bed mobility and SBA-CGA for transfers.  Amb approx 120 feet with no AD but does display mild path deviations and states he has some

## 2025-03-05 ENCOUNTER — OFFICE VISIT (OUTPATIENT)
Facility: CLINIC | Age: 70
End: 2025-03-05
Payer: MEDICARE

## 2025-03-05 VITALS
SYSTOLIC BLOOD PRESSURE: 111 MMHG | OXYGEN SATURATION: 100 % | BODY MASS INDEX: 28.17 KG/M2 | DIASTOLIC BLOOD PRESSURE: 77 MMHG | HEART RATE: 85 BPM | HEIGHT: 72 IN | TEMPERATURE: 98 F | WEIGHT: 208 LBS | RESPIRATION RATE: 17 BRPM

## 2025-03-05 DIAGNOSIS — I21.9 ACUTE MYOCARDIAL INFARCTION, UNSPECIFIED MI TYPE, UNSPECIFIED ARTERY (HCC): ICD-10-CM

## 2025-03-05 DIAGNOSIS — E11.65 TYPE 2 DIABETES MELLITUS WITH HYPERGLYCEMIA, WITHOUT LONG-TERM CURRENT USE OF INSULIN (HCC): ICD-10-CM

## 2025-03-05 DIAGNOSIS — Z00.01 ENCOUNTER FOR GENERAL ADULT MEDICAL EXAMINATION WITH ABNORMAL FINDINGS: Primary | ICD-10-CM

## 2025-03-05 DIAGNOSIS — I50.20 HFREF (HEART FAILURE WITH REDUCED EJECTION FRACTION) (HCC): ICD-10-CM

## 2025-03-05 DIAGNOSIS — I25.10 CORONARY ARTERY DISEASE DUE TO LIPID RICH PLAQUE: ICD-10-CM

## 2025-03-05 DIAGNOSIS — E78.5 HYPERLIPIDEMIA, UNSPECIFIED HYPERLIPIDEMIA TYPE: ICD-10-CM

## 2025-03-05 DIAGNOSIS — I25.83 CORONARY ARTERY DISEASE DUE TO LIPID RICH PLAQUE: ICD-10-CM

## 2025-03-05 PROCEDURE — 99204 OFFICE O/P NEW MOD 45 MIN: CPT

## 2025-03-05 RX ORDER — ROSUVASTATIN CALCIUM 40 MG/1
40 TABLET, COATED ORAL DAILY
Qty: 90 TABLET | Refills: 1 | Status: SHIPPED | OUTPATIENT
Start: 2025-03-05

## 2025-03-05 SDOH — ECONOMIC STABILITY: FOOD INSECURITY: WITHIN THE PAST 12 MONTHS, THE FOOD YOU BOUGHT JUST DIDN'T LAST AND YOU DIDN'T HAVE MONEY TO GET MORE.: SOMETIMES TRUE

## 2025-03-05 SDOH — ECONOMIC STABILITY: FOOD INSECURITY: WITHIN THE PAST 12 MONTHS, YOU WORRIED THAT YOUR FOOD WOULD RUN OUT BEFORE YOU GOT MONEY TO BUY MORE.: SOMETIMES TRUE

## 2025-03-05 ASSESSMENT — ENCOUNTER SYMPTOMS
CONSTIPATION: 0
EYES NEGATIVE: 1
SORE THROAT: 0
DIARRHEA: 0
RHINORRHEA: 0
ABDOMINAL PAIN: 0
SINUS PAIN: 0
SHORTNESS OF BREATH: 1
ALLERGIC/IMMUNOLOGIC NEGATIVE: 1
COUGH: 0

## 2025-03-05 NOTE — PATIENT INSTRUCTIONS
[email or web address]  Phone: [phone]  Visit the website above to apply or call [employer] directly to have an application mailed to you.    Donation based fee system for meals   Days and times vary depending on location, but most are open from 9:30AM-1:00PM, 4 days per week, and are closed on Saturday, Sunday, and major holidays

## 2025-03-05 NOTE — PROGRESS NOTES
Chief Complaint   Patient presents with    Follow-Up from Hospital     [facility] ED: 2/22/25 Chest pain  Stents placed    Hypertension     Cardiology : Dr Weems  External Defibrilator    Fatigue     \"Have you been to the ER, urgent care clinic since your last visit?  Hospitalized since your last visit?\"    [facility] ED: 2/22/25 Chest pain    “Have you seen or consulted any other health care providers outside our system since your last visit?”    NO      “Have you had a diabetic eye exam?”    NO     No diabetic eye exam on file            
knees.    MEDICATIONS  Current: Crestor, Lovenox, diclofenac gel    Review of Systems   Constitutional:  Negative for activity change, appetite change and fatigue.   HENT:  Negative for postnasal drip, rhinorrhea, sinus pain and sore throat.    Eyes: Negative.    Respiratory:  Positive for shortness of breath (With exertion). Negative for cough.    Cardiovascular:  Negative for chest pain.   Gastrointestinal:  Negative for abdominal pain, constipation and diarrhea.   Endocrine: Negative.    Musculoskeletal:  Negative for arthralgias, joint swelling and myalgias.   Skin: Negative.    Allergic/Immunologic: Negative.    Neurological:  Negative for dizziness, syncope, light-headedness and headaches.   Hematological: Negative.    Psychiatric/Behavioral: Negative.            Objective   Blood pressure 111/77, pulse 85, temperature 98 °F (36.7 °C), temperature source Oral, resp. rate 17, height 1.829 m (6'), weight 94.3 kg (208 lb), SpO2 100%.     Physical Exam  Constitutional:       General: He is not in acute distress.     Appearance: Normal appearance. He is not ill-appearing or toxic-appearing.   HENT:      Head: Normocephalic.   Eyes:      Conjunctiva/sclera: Conjunctivae normal.   Cardiovascular:      Rate and Rhythm: Normal rate and regular rhythm.      Heart sounds: No murmur heard.  Pulmonary:      Effort: Pulmonary effort is normal. No respiratory distress.      Breath sounds: Normal breath sounds.   Abdominal:      General: There is no distension.   Neurological:      General: No focal deficit present.      Mental Status: He is alert and oriented to person, place, and time.   Psychiatric:         Mood and Affect: Mood normal.         Behavior: Behavior normal.         Thought Content: Thought content normal.         Judgment: Judgment normal.            Assessment & Plan  1. Post-myocardial infarction status.  He is currently utilizing a LifeVest, which he finds cumbersome, and has an upcoming appointment with

## 2025-03-06 PROBLEM — I25.2 HISTORY OF ACUTE MYOCARDIAL INFARCTION: Status: ACTIVE | Noted: 2025-02-22

## 2025-06-05 ENCOUNTER — OFFICE VISIT (OUTPATIENT)
Facility: CLINIC | Age: 70
End: 2025-06-05
Payer: MEDICARE

## 2025-06-05 VITALS
HEART RATE: 75 BPM | WEIGHT: 191 LBS | SYSTOLIC BLOOD PRESSURE: 108 MMHG | BODY MASS INDEX: 25.87 KG/M2 | DIASTOLIC BLOOD PRESSURE: 73 MMHG | TEMPERATURE: 97.9 F | RESPIRATION RATE: 18 BRPM | HEIGHT: 72 IN | OXYGEN SATURATION: 98 %

## 2025-06-05 DIAGNOSIS — E11.42 TYPE 2 DIABETES MELLITUS WITH DIABETIC POLYNEUROPATHY, WITHOUT LONG-TERM CURRENT USE OF INSULIN (HCC): Primary | ICD-10-CM

## 2025-06-05 LAB
ANION GAP SERPL CALC-SCNC: 7 MMOL/L (ref 2–12)
BUN SERPL-MCNC: 25 MG/DL (ref 6–20)
BUN/CREAT SERPL: 21 (ref 12–20)
CALCIUM SERPL-MCNC: 9.2 MG/DL (ref 8.5–10.1)
CHLORIDE SERPL-SCNC: 105 MMOL/L (ref 97–108)
CO2 SERPL-SCNC: 26 MMOL/L (ref 21–32)
CREAT SERPL-MCNC: 1.2 MG/DL (ref 0.7–1.3)
ERYTHROCYTE [DISTWIDTH] IN BLOOD BY AUTOMATED COUNT: 14.6 % (ref 11.5–14.5)
EST. AVERAGE GLUCOSE BLD GHB EST-MCNC: 111 MG/DL
GLUCOSE SERPL-MCNC: 77 MG/DL (ref 65–100)
HBA1C MFR BLD: 5.5 % (ref 4–5.6)
HCT VFR BLD AUTO: 42.2 % (ref 36.6–50.3)
HGB BLD-MCNC: 12.9 G/DL (ref 12.1–17)
MCH RBC QN AUTO: 27.4 PG (ref 26–34)
MCHC RBC AUTO-ENTMCNC: 30.6 G/DL (ref 30–36.5)
MCV RBC AUTO: 89.6 FL (ref 80–99)
NRBC # BLD: 0 K/UL (ref 0–0.01)
NRBC BLD-RTO: 0 PER 100 WBC
PLATELET # BLD AUTO: 217 K/UL (ref 150–400)
PMV BLD AUTO: 10.5 FL (ref 8.9–12.9)
POTASSIUM SERPL-SCNC: 3.9 MMOL/L (ref 3.5–5.1)
RBC # BLD AUTO: 4.71 M/UL (ref 4.1–5.7)
SODIUM SERPL-SCNC: 138 MMOL/L (ref 136–145)
WBC # BLD AUTO: 5.3 K/UL (ref 4.1–11.1)

## 2025-06-05 PROCEDURE — 3078F DIAST BP <80 MM HG: CPT

## 2025-06-05 PROCEDURE — 1160F RVW MEDS BY RX/DR IN RCRD: CPT

## 2025-06-05 PROCEDURE — G8419 CALC BMI OUT NRM PARAM NOF/U: HCPCS

## 2025-06-05 PROCEDURE — G8427 DOCREV CUR MEDS BY ELIG CLIN: HCPCS

## 2025-06-05 PROCEDURE — 1159F MED LIST DOCD IN RCRD: CPT

## 2025-06-05 PROCEDURE — 99213 OFFICE O/P EST LOW 20 MIN: CPT

## 2025-06-05 PROCEDURE — 3074F SYST BP LT 130 MM HG: CPT

## 2025-06-05 PROCEDURE — 1123F ACP DISCUSS/DSCN MKR DOCD: CPT

## 2025-06-05 PROCEDURE — 3017F COLORECTAL CA SCREEN DOC REV: CPT

## 2025-06-05 PROCEDURE — 1126F AMNT PAIN NOTED NONE PRSNT: CPT

## 2025-06-05 PROCEDURE — 3051F HG A1C>EQUAL 7.0%<8.0%: CPT

## 2025-06-05 PROCEDURE — 2022F DILAT RTA XM EVC RTNOPTHY: CPT

## 2025-06-05 PROCEDURE — 1036F TOBACCO NON-USER: CPT

## 2025-06-05 NOTE — ASSESSMENT & PLAN NOTE
Patient has done tremendously well since adjusting diet, he is now down 20 lb!  He is encouraged to keep up the good work.  Will check A1C today to further assess.    Home logs reviewed - noted is his highest reading of 105 and frequent readings of low sugars into the 50's.  This was discussed as very likely being due to GLIPIZIDE.  If A1C is appropriate, then will recommend he d/c glipizide and stick with metformin going forward.     Orders:    CBC; Future    Basic Metabolic Panel; Future    Hemoglobin A1C; Future    metFORMIN (GLUCOPHAGE) 1000 MG tablet; Take 1 tablet by mouth 2 times daily (with meals)

## 2025-06-05 NOTE — PROGRESS NOTES
Chief Complaint   Patient presents with    Diabetes    Hypertension     Cardiology: Dr Weems    Lab Collection     Fasting today     Have you been to the ER, urgent care clinic since your last visit?  Hospitalized since your last visit?   NO    Have you seen or consulted any other health care providers outside our system since your last visit?   Cardiology: Dr Weems      “Have you had a diabetic eye exam?”    NO     No diabetic eye exam on file

## 2025-06-05 NOTE — PROGRESS NOTES
Adrian Garay (:  1955) is a 70 y.o. male,Established patient, here for evaluation of the following chief complaint(s):  Diabetes, Hypertension (Cardiology: Dr Weems), and Lab Collection (Fasting today)         Assessment & Plan  Type 2 diabetes mellitus with diabetic polyneuropathy, without long-term current use of insulin (HCC)  Patient has done tremendously well since adjusting diet, he is now down 20 lb!  He is encouraged to keep up the good work.  Will check A1C today to further assess.    Home logs reviewed - noted is his highest reading of 105 and frequent readings of low sugars into the 50's.  This was discussed as very likely being due to GLIPIZIDE.  If A1C is appropriate, then will recommend he d/c glipizide and stick with metformin going forward.     Orders:    CBC; Future    Basic Metabolic Panel; Future    Hemoglobin A1C; Future    metFORMIN (GLUCOPHAGE) 1000 MG tablet; Take 1 tablet by mouth 2 times daily (with meals)      Return in about 3 months (around 2025) for chronic checkup.       Subjective   Chief Complaint  Patient presents with  · Diabetes  · Hypertension      Cardiology: Dr Weems  · Lab Collection      Fasting today        Review of Systems   All other systems reviewed and are negative.         Objective   Physical Exam  Constitutional:       General: He is not in acute distress.     Appearance: Normal appearance. He is not ill-appearing.   HENT:      Head: Normocephalic and atraumatic.      Right Ear: External ear normal.      Left Ear: External ear normal.      Nose: Nose normal.   Eyes:      General: No scleral icterus.        Right eye: No discharge.         Left eye: No discharge.      Extraocular Movements: Extraocular movements intact.      Conjunctiva/sclera: Conjunctivae normal.   Cardiovascular:      Rate and Rhythm: Normal rate and regular rhythm.      Pulses: Normal pulses.      Heart sounds: Normal heart sounds. No murmur heard.     No friction rub. No gallop.

## 2025-06-06 ENCOUNTER — RESULTS FOLLOW-UP (OUTPATIENT)
Facility: CLINIC | Age: 70
End: 2025-06-06

## 2025-09-05 ENCOUNTER — OFFICE VISIT (OUTPATIENT)
Facility: CLINIC | Age: 70
End: 2025-09-05
Payer: MEDICARE

## 2025-09-05 VITALS
SYSTOLIC BLOOD PRESSURE: 118 MMHG | DIASTOLIC BLOOD PRESSURE: 78 MMHG | BODY MASS INDEX: 26.68 KG/M2 | RESPIRATION RATE: 17 BRPM | OXYGEN SATURATION: 98 % | HEART RATE: 67 BPM | HEIGHT: 72 IN | WEIGHT: 197 LBS

## 2025-09-05 DIAGNOSIS — E11.42 TYPE 2 DIABETES MELLITUS WITH DIABETIC POLYNEUROPATHY, WITHOUT LONG-TERM CURRENT USE OF INSULIN (HCC): ICD-10-CM

## 2025-09-05 DIAGNOSIS — I25.83 CORONARY ARTERY DISEASE DUE TO LIPID RICH PLAQUE: ICD-10-CM

## 2025-09-05 DIAGNOSIS — I21.11 ST ELEVATION MYOCARDIAL INFARCTION INVOLVING RIGHT CORONARY ARTERY (HCC): Primary | ICD-10-CM

## 2025-09-05 DIAGNOSIS — I25.5 ISCHEMIC CARDIOMYOPATHY: ICD-10-CM

## 2025-09-05 DIAGNOSIS — I50.20 HFREF (HEART FAILURE WITH REDUCED EJECTION FRACTION) (HCC): ICD-10-CM

## 2025-09-05 DIAGNOSIS — E78.5 HYPERLIPIDEMIA, UNSPECIFIED HYPERLIPIDEMIA TYPE: ICD-10-CM

## 2025-09-05 DIAGNOSIS — K21.9 GASTROESOPHAGEAL REFLUX DISEASE WITHOUT ESOPHAGITIS: ICD-10-CM

## 2025-09-05 DIAGNOSIS — I25.10 CORONARY ARTERY DISEASE DUE TO LIPID RICH PLAQUE: ICD-10-CM

## 2025-09-05 DIAGNOSIS — I15.9 SECONDARY HYPERTENSION: ICD-10-CM

## 2025-09-05 DIAGNOSIS — I25.2 HISTORY OF ACUTE MYOCARDIAL INFARCTION: ICD-10-CM

## 2025-09-05 DIAGNOSIS — Z86.79 HISTORY OF ATRIAL FIBRILLATION: ICD-10-CM

## 2025-09-05 LAB
ALBUMIN SERPL-MCNC: 3.7 G/DL (ref 3.5–5.2)
ALBUMIN/GLOB SERPL: 1.3 (ref 1.1–2.2)
ALP SERPL-CCNC: 65 U/L (ref 40–129)
ALT SERPL-CCNC: 16 U/L (ref 10–50)
ANION GAP SERPL CALC-SCNC: 13 MMOL/L (ref 2–14)
AST SERPL-CCNC: 16 U/L (ref 10–50)
BILIRUB SERPL-MCNC: 0.4 MG/DL (ref 0–1.2)
BUN SERPL-MCNC: 29 MG/DL (ref 8–23)
BUN/CREAT SERPL: 27 (ref 12–20)
CALCIUM SERPL-MCNC: 9 MG/DL (ref 8.8–10.2)
CHLORIDE SERPL-SCNC: 105 MMOL/L (ref 98–107)
CO2 SERPL-SCNC: 23 MMOL/L (ref 20–29)
CREAT SERPL-MCNC: 1.1 MG/DL (ref 0.7–1.2)
EST. AVERAGE GLUCOSE BLD GHB EST-MCNC: 156 MG/DL
GLOBULIN SER CALC-MCNC: 2.7 G/DL (ref 2–4)
GLUCOSE SERPL-MCNC: 156 MG/DL (ref 65–100)
HBA1C MFR BLD: 7.1 % (ref 4–5.6)
POTASSIUM SERPL-SCNC: 4.5 MMOL/L (ref 3.5–5.1)
PROT SERPL-MCNC: 6.4 G/DL (ref 6.4–8.3)
SODIUM SERPL-SCNC: 141 MMOL/L (ref 136–145)

## 2025-09-05 PROCEDURE — 1036F TOBACCO NON-USER: CPT

## 2025-09-05 PROCEDURE — 99214 OFFICE O/P EST MOD 30 MIN: CPT

## 2025-09-05 PROCEDURE — G8419 CALC BMI OUT NRM PARAM NOF/U: HCPCS

## 2025-09-05 PROCEDURE — G8427 DOCREV CUR MEDS BY ELIG CLIN: HCPCS

## 2025-09-05 PROCEDURE — 2022F DILAT RTA XM EVC RTNOPTHY: CPT

## 2025-09-05 PROCEDURE — 3078F DIAST BP <80 MM HG: CPT

## 2025-09-05 PROCEDURE — 1160F RVW MEDS BY RX/DR IN RCRD: CPT

## 2025-09-05 PROCEDURE — 3074F SYST BP LT 130 MM HG: CPT

## 2025-09-05 PROCEDURE — 1159F MED LIST DOCD IN RCRD: CPT

## 2025-09-05 PROCEDURE — 3044F HG A1C LEVEL LT 7.0%: CPT

## 2025-09-05 PROCEDURE — 1123F ACP DISCUSS/DSCN MKR DOCD: CPT

## 2025-09-05 PROCEDURE — 3017F COLORECTAL CA SCREEN DOC REV: CPT

## 2025-09-05 RX ORDER — ROSUVASTATIN CALCIUM 40 MG/1
40 TABLET, COATED ORAL DAILY
Qty: 90 TABLET | Refills: 3 | Status: SHIPPED | OUTPATIENT
Start: 2025-09-05

## 2025-09-05 RX ORDER — OMEPRAZOLE 40 MG/1
40 CAPSULE, DELAYED RELEASE ORAL DAILY
Qty: 90 CAPSULE | Refills: 3 | Status: SHIPPED | OUTPATIENT
Start: 2025-09-05

## (undated) DEVICE — TUBING PRSS MON L12IN PVC RIG NONEXPANDING M TO FEM CONN

## (undated) DEVICE — VALVE IV REFLX W/ M/F LUER LCK UNIV CAP STRL DISP

## (undated) DEVICE — CANISTER SUCTION VAC PORTABLE 1000 CC FOR INDIGO SYS ENGINE

## (undated) DEVICE — CONNECTOR FLD DISPNS FOR FILL UD SYRINGES

## (undated) DEVICE — GLIDESHEATH SLENDER STAINLESS STEEL KIT: Brand: GLIDESHEATH SLENDER

## (undated) DEVICE — CATHETER DIAG 5FR L100CM LUMN ID0.047IN JL3.5 CRV 0 SIDE H

## (undated) DEVICE — KENDALL DL ECG DUAL CONNECT RADIOLUCENT LEAD WIRES, 5-LEAD, SINGLE PATIENT USE: Brand: KENDALL

## (undated) DEVICE — RUNTHROUGH NS EXTRA FLOPPY PTCA GUIDEWIRE: Brand: RUNTHROUGH

## (undated) DEVICE — CATHETER DIAG 5FR L100CM LUMN ID0.047IN JR4 CRV 0 SIDE H

## (undated) DEVICE — SC 3W HP RA OFF NB - PG: Brand: NAMIC

## (undated) DEVICE — CATHETER THROMCTMY INDIGO CAT PENUMBRA 140CM RX L LUMN ASPIR TBNG

## (undated) DEVICE — DEVICE INFL 20ML 30ATM DGT FLD DISPNS SYR W ACCESSPLUS BLU

## (undated) DEVICE — Device: Brand: EAGLE EYE PLATINUM RX DIGITAL IVUS CATHETER

## (undated) DEVICE — CATH BLLN ANGIO 3.50X8MM NC EUPHORIA RX

## (undated) DEVICE — VALVE HEMSTAT 8FR INNR LUMN CRSS SLT SEAL DSGN WATCHDOG

## (undated) DEVICE — ELECTRODE,RADIOTRANSLUCENT,FOAM,5PK: Brand: MEDLINE

## (undated) DEVICE — TR BAND RADIAL ARTERY COMPRESSION DEVICE: Brand: TR BAND

## (undated) DEVICE — GUIDEWIRE VASC L180CM DIA0.035IN 3MM PTFE J TIP EXCHG FIX

## (undated) DEVICE — CATH BLLN ANGIO 3X15MM SC EUPHORA RX

## (undated) DEVICE — GUIDEWIRE VASC J 3 MM 0.035 INX210 CM FIX COR INQWIRE

## (undated) DEVICE — ARGO BAGZ FLUID MANAGEMENT SYSTEM: Brand: ARGO BAGZ FLUID MANAGEMENT SYSTEM

## (undated) DEVICE — CATHETER GUID 6FR 0.071IN COR AMPLATZ L 0.75 MID

## (undated) DEVICE — CATH BLLN ANGIO 2X12MM SC EUPHORA RX

## (undated) DEVICE — HI-TORQUE VERSACORE MODIFIED J GUIDE WIRE SYSTEM 260 CM: Brand: HI-TORQUE VERSACORE